# Patient Record
Sex: MALE | Race: WHITE | Employment: OTHER | URBAN - METROPOLITAN AREA
[De-identification: names, ages, dates, MRNs, and addresses within clinical notes are randomized per-mention and may not be internally consistent; named-entity substitution may affect disease eponyms.]

---

## 2021-07-19 ENCOUNTER — TRANSITIONAL CARE UNIT VISIT (OUTPATIENT)
Dept: GERIATRICS | Facility: CLINIC | Age: 71
End: 2021-07-19
Payer: MEDICARE

## 2021-07-19 ENCOUNTER — TRANSFERRED RECORDS (OUTPATIENT)
Dept: HEALTH INFORMATION MANAGEMENT | Facility: CLINIC | Age: 71
End: 2021-07-19

## 2021-07-19 ENCOUNTER — ANTICOAGULATION THERAPY VISIT (OUTPATIENT)
Dept: ANTICOAGULATION | Facility: CLINIC | Age: 71
End: 2021-07-19

## 2021-07-19 VITALS
RESPIRATION RATE: 18 BRPM | SYSTOLIC BLOOD PRESSURE: 144 MMHG | TEMPERATURE: 97 F | HEART RATE: 84 BPM | DIASTOLIC BLOOD PRESSURE: 80 MMHG | OXYGEN SATURATION: 98 %

## 2021-07-19 DIAGNOSIS — J42 CHRONIC BRONCHITIS, UNSPECIFIED CHRONIC BRONCHITIS TYPE (H): ICD-10-CM

## 2021-07-19 DIAGNOSIS — S80.12XS LEG HEMATOMA, LEFT, SEQUELA: ICD-10-CM

## 2021-07-19 DIAGNOSIS — Z79.4 TYPE 2 DIABETES MELLITUS WITH HYPERGLYCEMIA, WITH LONG-TERM CURRENT USE OF INSULIN (H): ICD-10-CM

## 2021-07-19 DIAGNOSIS — N18.9 CHRONIC KIDNEY DISEASE, UNSPECIFIED CKD STAGE: ICD-10-CM

## 2021-07-19 DIAGNOSIS — E11.65 TYPE 2 DIABETES MELLITUS WITH HYPERGLYCEMIA, WITH LONG-TERM CURRENT USE OF INSULIN (H): ICD-10-CM

## 2021-07-19 DIAGNOSIS — I48.91 A-FIB (H): Primary | ICD-10-CM

## 2021-07-19 DIAGNOSIS — R52 PAIN MANAGEMENT: ICD-10-CM

## 2021-07-19 DIAGNOSIS — E66.01 MORBID OBESITY (H): ICD-10-CM

## 2021-07-19 LAB — INR PPP: 2.6

## 2021-07-19 PROCEDURE — 99305 1ST NF CARE MODERATE MDM 35: CPT | Performed by: FAMILY MEDICINE

## 2021-07-19 NOTE — LETTER
7/19/2021        RE: Oliver Lawson  [address] [facility] [address]        [facility] GERIATRIC SERVICES    Facility:  [facility] ([facility]) [[id number]]  Code Status: FULL CODE      CHIEF COMPLAINT/REASON FOR VISIT:  Chief Complaint   Patient presents with     Hospital F/U       HPI:   Oliver is a 70 year old male who was recently admitted to the hospital on 7/12/2021. He does have a history of type 2 diabetes with chronic kidney disease unspecified. He also had a left below the knee amputation 2018 is currently on Coumadin. He was admitted to the hospital after fall he sustained by inappropriately or not appropriately putting his sleeve on for his prosthesis he slipped out of the prosthesis and fell on the ground. He was brought to the hospital and have mild contusion with no ecchymosis. Orthotics consulted and patient was fitted with a smaller  and patient improved with ice and elevation. Warfarin was continued hematoma was noted and likely the hematoma occurred with initial trauma no signs of any further bleeding. He is to follow-up with orthotics. He was discharged on reduced insulin dose of 45 units of Lantus and 12 units aspart. His blood sugars have been running 94 up to 335. This morning it was 149. Patient was treated appropriately and transferred here to the TCU at [facility] in stable condition.    Patient is sitting in his chair comfortably he is alert and oriented does not appear to be in acute distress. He is moving his bowels well at this time urinating without difficulty. The stump is still swollen does not fit in the prosthesis anymore and pain is well managed. He denies any other new issues at this time.    Past Medical History:  Past Medical History:   Diagnosis Date     Dyspnea on exertion      Dysthymic disorder      Gastro-oesophageal reflux disease      Hypertension      Lower limb amputation, great toe (H)     H/O OSTEOMYELITIS     Methicillin  resistant Staphylococcus aureus in conditions classified elsewhere and of unspecified site      Morbid obesity (H)      Nonspecific abnormal electrocardiogram (ECG) (EKG)      Numbness and tingling      Open wound of foot except toe(s) alone, complicated      Other and unspecified alcohol dependence, unspecified drinking behavior      Other and unspecified hyperlipidemia      Other diseases of lung, not elsewhere classified     NODULES     Renal insufficiency      Rheumatoid arthritis (H)      Shortness of breath      Sleep apnea     USES CPAP     Type II or unspecified type diabetes mellitus without mention of complication, not stated as uncontrolled      Unspecified pleural effusion            Surgical History:  Past Surgical History:   Procedure Laterality Date     CHEILECTOMY  10/21/2013    Procedure: CHEILECTOMY;  RIGHT CHEILECTOMY, CALCANEAL CUBITAL  AND DEBRIDEMENT;  Surgeon: Katharina Agudelo MD;  Location: [facility]     ENT SURGERY      T&A     EYE SURGERY      LASER FOR DIABETIC RETINOPATHY     GENITOURINARY SURGERY      BILAT HYDROCELECTOMY/VASECTOMY     GI SURGERY      PILONIDAL CYST     HEAD & NECK SURGERY      WISDOM TEETH     HERNIA REPAIR       ORTHOPEDIC SURGERY      right  and left toes, right YUNG       Family History:   No family history on file.    Social History:    Social History     Socioeconomic History     Marital status:      Spouse name: Not on file     Number of children: Not on file     Years of education: Not on file     Highest education level: Not on file   Occupational History     Not on file   Tobacco Use     Smoking status: Former Smoker     Quit date: 1993     Years since quittin.5   Substance and Sexual Activity     Alcohol use: No     Comment: NOT SINCE 12/12/10     Drug use: No     Sexual activity: Not on file   Other Topics Concern     Not on file   Social History Narrative     Not on file     Social Determinants of Health     Financial Resource Strain:       Difficulty of Paying Living Expenses:    Food Insecurity:      Worried About Running Out of Food in the Last Year:      Ran Out of Food in the Last Year:    Transportation Needs:      Lack of Transportation (Medical):      Lack of Transportation (Non-Medical):    Physical Activity:      Days of Exercise per Week:      Minutes of Exercise per Session:    Stress:      Feeling of Stress :    Social Connections:      Frequency of Communication with Friends and Family:      Frequency of Social Gatherings with Friends and Family:      Attends Protestant Services:      Active Member of Clubs or Organizations:      Attends Club or Organization Meetings:      Marital Status:    Intimate Partner Violence:      Fear of Current or Ex-Partner:      Emotionally Abused:      Physically Abused:      Sexually Abused:        REVIEW OF SYSTEM: Patient claims his pain is under adequate control denies any fever chills nausea vomit diarrhea change in vision hearing taste or smell weakness one-sided chest pain shortness of breath. Denies any current shortness stool polyphagia polydipsia polyuria depression or anxiety and the main review of systems is negative.      PHYSICAL EXAM: Patient is alert pleasant does not appear to be in acute distress. Her mucosa is moist nasal discharge neck is supple without lymphadenopathy or thyromegaly. Heart sounds are regular without any rubs murmurs or gallops lungs are clear to auscultation without any crackles rales or wheezes. Abdomen was obese nontender bowel sounds are positive. Right extremity did show some tenderness in the anterior part of the shin but no real calf tenderness and no thigh tenderness. Homans' sign was negative on the right. The left prosthesis had a stump  on it at this time but no signs of any tenderness but there was definitely palpable hematoma. Affect was pleasant neurologic seems nonfocal.        LABS: Hospital labs are as follows INR was 2.3 pro time was 24.9, blood  sugars running all over the place. GFR was greater than 60, sodium was 137, potassium 4.8, CO2 is 23, hemoglobin is 12.9,.      ASSESSMENT:    Encounter Diagnoses   Name Primary?     Leg hematoma, left, sequela      Pain management      Type 2 diabetes mellitus with hyperglycemia, with long-term current use of insulin (H)      Chronic kidney disease, unspecified CKD stage      Chronic bronchitis, unspecified chronic bronchitis type (H)      Morbid obesity (H)         PLAN: Plan at this time Coumadin clinic will manage INR.    Patient will continue on his same pain medication without any new changes.    Chronic kidney disease we will continue to monitor.    Chronic bronchitis not in exacerbation continue cares at this time.    Type 2 diabetes with hyperglycemia we will have nutrition follow for consistent carbohydrate diet and no changes in meds at this time.    I will continue to monitor above medical problems and no other changes to care plan at this time. We will follow his progress with therapy.        Electronically signed by: RIVERA LAY DO        Sincerely,        RIVERA LAY DO

## 2021-07-19 NOTE — PROGRESS NOTES
ANTICOAGULATION MANAGEMENT     Oliver Lawson 70 year old male is on warfarin with therapeutic INR result. (Goal INR 2.0-3.0)    No results for input(s): INR in the last 168 hours.    ASSESSMENT     Source(s): Home Care/Facility Nurse and Template       Warfarin doses taken: Warfarin taken as instructed    Diet: No new diet changes identified    New illness, injury, or hospitalization: yes, at [facility] 7/12-16 [facility] to [facility], leg prosthesis not fitting after fall    Medication/supplement changes: None noted    Signs or symptoms of bleeding or clotting: No    Previous INR: therapeutic    Additional findings: None new to [facility] following hospitalization for fall after he slipped out of leg prosthesis, hematoma. Warfarin dx is afib     PLAN     Recommended plan for no diet, medication or health factor changes affecting INR     Dosing Instructions: Continue your current warfarin dose with next INR in 3 days       Summary  As of 7/19/2021    Full warfarin instructions:  7.5 mg every day   Next INR check:  7/22/2021             Telephone call with Kerry nurse at [facility] who verbalizes understanding and agrees to plan    Lab visit scheduled    Education provided: None required    Plan made per [facility] anticoagulation protocol    Reynaldo Pritchett RN  Anticoagulation Clinic  7/19/2021    _______________________________________________________________________     Anticoagulation Episode Summary     Current INR goal:  2.0-3.0   TTR:  --   Target end date:     Send INR reminders to:  [facility] (TCU/LTC/TACHO)       Comments:           Anticoagulation Care Providers     Provider Role Specialty Phone number    Kamran Stern DO  Family Medicine [phone]

## 2021-07-20 ENCOUNTER — TRANSITIONAL CARE UNIT VISIT (OUTPATIENT)
Dept: GERIATRICS | Facility: CLINIC | Age: 71
End: 2021-07-20
Payer: MEDICARE

## 2021-07-20 VITALS
OXYGEN SATURATION: 99 % | HEART RATE: 63 BPM | SYSTOLIC BLOOD PRESSURE: 120 MMHG | RESPIRATION RATE: 18 BRPM | BODY MASS INDEX: 41.15 KG/M2 | TEMPERATURE: 97.2 F | DIASTOLIC BLOOD PRESSURE: 71 MMHG | WEIGHT: 315 LBS

## 2021-07-20 DIAGNOSIS — Z79.4 TYPE 2 DIABETES MELLITUS WITH HYPERGLYCEMIA, WITH LONG-TERM CURRENT USE OF INSULIN (H): ICD-10-CM

## 2021-07-20 DIAGNOSIS — E11.65 TYPE 2 DIABETES MELLITUS WITH HYPERGLYCEMIA, WITH LONG-TERM CURRENT USE OF INSULIN (H): ICD-10-CM

## 2021-07-20 DIAGNOSIS — Z89.512 HX OF BKA, LEFT (H): ICD-10-CM

## 2021-07-20 DIAGNOSIS — I10 ESSENTIAL HYPERTENSION: ICD-10-CM

## 2021-07-20 DIAGNOSIS — S80.12XS LEG HEMATOMA, LEFT, SEQUELA: Primary | ICD-10-CM

## 2021-07-20 DIAGNOSIS — N18.9 CHRONIC KIDNEY DISEASE, UNSPECIFIED CKD STAGE: ICD-10-CM

## 2021-07-20 DIAGNOSIS — I48.0 PAROXYSMAL ATRIAL FIBRILLATION (H): ICD-10-CM

## 2021-07-20 PROCEDURE — 99309 SBSQ NF CARE MODERATE MDM 30: CPT | Performed by: NURSE PRACTITIONER

## 2021-07-20 NOTE — PROGRESS NOTES
[facility] GERIATRIC SERVICES    Code Status:  FULL CODE   Visit Type:   Chief Complaint   Patient presents with     Nursing Home Acute     TCU Follow up     Facility:  [facility] ([facility]) [[id number]]           History of Present Illness: Oliver Lawson is a 70 year old male who I am seeing today for follow up on the TCU. Pt recently hospitalized on 7/12/2021.  Past medical history includes uncontrolled diabetes mellitus type 2 with recurrent diabetic foot wound status post left BKA in 2018, CKD, proximal atrial fib on warfarin, complete heart block, status post PPM, RA on methotrexate, class III obesity, COPD, GERD and depression.  Patient recently had a fall onto his hands and knees with his left leg slipping out of his prosthesis.  Patient suffered a contusion of the left stump.  X-ray of the right knee and left amputation site negative for fracture.  Orthotics was consulted and patient fitted with a smaller strength stump .  Patient had some difficulty fitting into his prosthesis.  He underwent a CT which demonstrated a 7.5 x 5 cm hematoma at the amputation stump.  Warfarin was continued.  Hemoglobin remained stable.  Patient was seen by orthotics and it was recommended that he continue in shrinkers until prosthesis can fit in about 7 to 10 days.  Patient is unable to ambulate without prosthesis.  Diabetes mellitus type 2.  His insulin was decreased during hospitalization.  His warfarin was also reduced.    Today patient lying in bed.  He is still unable to get his stump on.  Continues with size 2 stump .  He says orthotics is, not on Friday to fit him for a size 1 stump .  He continues with hematoma.  No increase evidence of bleeding.  Hemoglobin has remained stable.  He does have underlying proximal atrial fib and continues on chronic anticoagulation.  INRs managed per the Coumadin clinic.  Hypertension.  Blood pressure satisfactory.  Pain control with Tylenol.  Diabetes mellitus  type 2.  Blood sugars at this time satisfactory.  Patient does report some chronic stuffiness of the nose and postnasal drip.  He has tried Flonase in the past.  He continues on Zyrtec  Which she feels was not very helpful.    Active Ambulatory Problems     Diagnosis Date Noted     Leg hematoma, left, sequela 07/19/2021     Pain management 07/19/2021     Type 2 diabetes mellitus with hyperglycemia (H) 07/19/2021     Chronic kidney disease, unspecified CKD stage 07/19/2021     Chronic bronchitis (H) 07/19/2021     Morbid obesity (H) 07/19/2021     A-fib (H) 07/19/2021     Resolved Ambulatory Problems     Diagnosis Date Noted     No Resolved Ambulatory Problems     Past Medical History:   Diagnosis Date     Dyspnea on exertion      Dysthymic disorder      Gastro-oesophageal reflux disease      Hypertension      Lower limb amputation, great toe (H)      Methicillin resistant Staphylococcus aureus in conditions classified elsewhere and of unspecified site      Nonspecific abnormal electrocardiogram (ECG) (EKG)      Numbness and tingling      Open wound of foot except toe(s) alone, complicated      Other and unspecified alcohol dependence, unspecified drinking behavior      Other and unspecified hyperlipidemia      Other diseases of lung, not elsewhere classified      Renal insufficiency      Rheumatoid arthritis (H)      Shortness of breath      Sleep apnea      Type II or unspecified type diabetes mellitus without mention of complication, not stated as uncontrolled      Unspecified pleural effusion        Current Outpatient Medications:      ASPIRIN PO, Take 81 mg by mouth daily, Disp: , Rfl:      Atorvastatin Calcium (LIPITOR PO), Take 80 mg by mouth daily, Disp: , Rfl:      CIPROFLOXACIN PO, Take 500 mg by mouth 2 times daily, Disp: , Rfl:      FOLIC ACID PO, Take 1 mg by mouth daily, Disp: , Rfl:      HYDROcodone-acetaminophen (NORCO) 5-325 MG per tablet, Take 1-2 tablets by mouth every 4 hours as needed for other  (Moderate to Severe Pain), Disp: 50 tablet, Rfl: 0     insulin aspart (NovoLOG) injection, Inject Subcutaneous See Admin Instructions 23 UNITS BREAKFAST 25 UNITS LUNCH 25 UNITS DINNER, Disp: , Rfl:      insulin glargine (LANTUS) SOLN 100 UNIT/ML, Inject 62 Units Subcutaneous At Bedtime, Disp: , Rfl:      LISINOPRIL PO, Take 40 mg by mouth daily TAKE 0.5 TAB DAILY [20 MG DAILY], Disp: , Rfl:      Methotrexate, Anti-Rheumatic, (RHEUMATREX PO), Take 2.5 mg by mouth once a week TAKE 8 TABS ONCE A WEEK, Disp: , Rfl:      Metoprolol Succinate (TOPROL XL PO), Take 50 mg by mouth daily, Disp: , Rfl:      NIFEdipine osmotic (NIFEDICAL XL) 30 MG 24 hr tablet, Take 30 mg by mouth daily, Disp: , Rfl:      Nitroglycerin (NITROSTAT SL), Place 0.4 mg under the tongue every 5 minutes as needed, Disp: , Rfl:      Pantoprazole Sodium (PROTONIX PO), Take 40 mg by mouth every morning (before breakfast), Disp: , Rfl:      senna-docusate (SENOKOT-S;PERICOLACE) 8.6-50 MG per tablet, Take 2 tablets by mouth daily as needed for constipation Take while on oral narcotics to prevent or treat constipation., Disp: 30 tablet, Rfl: 0     Sertraline HCl (ZOLOFT PO), Take 100 mg by mouth daily, Disp: , Rfl:      Sulfamethoxazole-Trimethoprim (BACTRIM DS PO), Take 1 tablet by mouth 2 times daily, Disp: , Rfl:   Allergies   Allergen Reactions     Angiotensin Receptor Blockers      ARB-Angiotensin Receptor Antagonist     Contrast Dye      Diatrizoate Other (See Comments)     Patient recommended by a past doctor to not use Contrast Dye due to body chemistry    -- seriously? Someone check into this     Ace Inhibitors Cough     Changed to ARB.        All Meds and Allergies reviewed in the record at the facility.     REVIEW OF SYSTEMS:   Review of Systems  No fevers or chills. No headache, lightheadedness or dizziness.  Chronic stuffiness.  No SOB, chest pains or palpitations. Appetite is good. No nausea, vomiting, constipation or diarrhea. No dysuria,  frequency, burning or pain with urination.  Patient continues in stump .  He continues not to be able to use his prosthesis.  Pain well controlled Tylenol.  Otherwise review of systems are negative.     PHYSICAL EXAMINATION:  Physical Exam     Vital signs: /71   Pulse 63   Temp 97.2  F (36.2  C)   Resp 18   Wt 143.4 kg (316 lb 3.2 oz)   SpO2 99%   BMI 41.15 kg/m    General: Awake, Alert, oriented x3, appropriately, follows simple commands, conversant  HEENT: Pink conjunctiva, anicteric sclerae, moist oral mucosa.  Nasal stuffiness.  NECK: Supple, without any lymphadenopathy, or masses  CVS:  S1  S2, without murmur or gallop.   LUNG: Clear to auscultation, No wheezes, rales or rhonci.  BACK: No kyphosis of the thoracic spine  ABDOMEN: Soft, obese, nontender to palpation, with positive bowel sounds  EXTREMITIES: Moves both upper and lower extremities, no pedal edema, no calf tenderness.  Left BKA.  SKIN: Warm and dry.  Hematoma to the base of the stump on the left as well as mid calf of the right leg.   NEUROLOGIC: Intact, pulses palpable on the RLE.   PSYCHIATRIC: Cognition intact      Labs:  All labs reviewed in the nursing home record.  Recent Results (from the past 48 hour(s))   INR    Collection Time: 07/19/21 12:00 AM   Result Value Ref Range    INR 2.6         Assessment/Plan:    ICD-10-CM    1. Leg hematoma, left, sequela  S80.12XS  continue with icing and elevation.  Continue with stump .  Patient to be fitted for size 1 stump  on Friday.   2. Pain management  R52  continue with Tylenol and ice.   3. Type 2 diabetes mellitus with hyperglycemia, with long-term current use of insulin (H)  E11.65  satisfactory controlled.  Continue to monitor.  Patient may need adjustments in insulin secondary to decreased during hospitalization.    Z79.4    4. Chronic kidney disease, unspecified CKD stage  N18.9  chronic.  Follow-up BMP on Thursday.   5. Atrial fib   continues on Xarelto.    6. Essential hypertension   satisfactory control.  Follow-up BMP on Thursday.         This note has been dictated using voice recognition software. Any grammatical or context distortions are unintentional and inherent to the software    Electronically signed by: Tram Guy CNP

## 2021-07-22 ENCOUNTER — TRANSITIONAL CARE UNIT VISIT (OUTPATIENT)
Dept: GERIATRICS | Facility: CLINIC | Age: 71
End: 2021-07-22
Payer: MEDICARE

## 2021-07-22 ENCOUNTER — ANTICOAGULATION THERAPY VISIT (OUTPATIENT)
Dept: ANTICOAGULATION | Facility: CLINIC | Age: 71
End: 2021-07-22

## 2021-07-22 VITALS
BODY MASS INDEX: 41.15 KG/M2 | TEMPERATURE: 97 F | SYSTOLIC BLOOD PRESSURE: 126 MMHG | HEART RATE: 60 BPM | OXYGEN SATURATION: 98 % | WEIGHT: 315 LBS | DIASTOLIC BLOOD PRESSURE: 73 MMHG | RESPIRATION RATE: 20 BRPM

## 2021-07-22 DIAGNOSIS — S80.12XS LEG HEMATOMA, LEFT, SEQUELA: Primary | ICD-10-CM

## 2021-07-22 DIAGNOSIS — E11.65 TYPE 2 DIABETES MELLITUS WITH HYPERGLYCEMIA, WITH LONG-TERM CURRENT USE OF INSULIN (H): ICD-10-CM

## 2021-07-22 DIAGNOSIS — I48.0 PAROXYSMAL ATRIAL FIBRILLATION (H): ICD-10-CM

## 2021-07-22 DIAGNOSIS — Z89.512 HX OF BKA, LEFT (H): ICD-10-CM

## 2021-07-22 DIAGNOSIS — Z79.4 TYPE 2 DIABETES MELLITUS WITH HYPERGLYCEMIA, WITH LONG-TERM CURRENT USE OF INSULIN (H): ICD-10-CM

## 2021-07-22 DIAGNOSIS — R09.82 PND (POST-NASAL DRIP): ICD-10-CM

## 2021-07-22 LAB — INR (EXTERNAL): 2.4 (ref 0.9–1.1)

## 2021-07-22 PROCEDURE — 99309 SBSQ NF CARE MODERATE MDM 30: CPT | Performed by: NURSE PRACTITIONER

## 2021-07-22 RX ORDER — LORATADINE 10 MG/1
10 TABLET ORAL DAILY
COMMUNITY
End: 2022-05-09

## 2021-07-22 NOTE — PROGRESS NOTES
[facility] GERIATRIC SERVICES    Code Status:  FULL CODE   Visit Type TCU follow-up (left BKA hematoma)   Facility: [facility] skilled nursing [facility], TCU      History of Present Illness: Oliver Lawson is a 70 year old male who I am seeing today for follow up on the TCU. Pt recently hospitalized on 7/12/2021.  Past medical history includes uncontrolled diabetes mellitus type 2 with recurrent diabetic foot wound status post left BKA in 2018, CKD, proximal atrial fib on warfarin, complete heart block, status post PPM, RA on methotrexate, class III obesity, COPD, GERD and depression.  Patient recently had a fall onto his hands and knees with his left leg slipping out of his prosthesis.  Patient suffered a contusion of the left stump.  X-ray of the right knee and left amputation site negative for fracture.  Orthotics was consulted and patient fitted with a smaller strength stump .  Patient had some difficulty fitting into his prosthesis.  He underwent a CT which demonstrated a 7.5 x 5 cm hematoma at the amputation stump.  Warfarin was continued.  Hemoglobin remained stable.  Patient was seen by orthotics and it was recommended that he continue in shrinkers until prosthesis can fit in about 7 to 10 days.  Patient is unable to ambulate without prosthesis.  Diabetes mellitus type 2.  His insulin was decreased during hospitalization.  His warfarin was also reduced.    Today patient lying in bed.  Patient continues with hematoma to left stump.  Today orthotics is to come out and place him in a size 1 stump .  He continues with ice.  Pain greatly improved.  Continues on Tylenol.  Underlying diabetes mellitus type 2.  He does have a blood sugar control device.  Today he had a blood sugar of 78.  He was asymptomatic.  When he looked back over all his blood sugars are satisfactory.  Patient continues on aspart 12 units 3 times daily along with sliding scale and Lantus 45 at bedtime.  He did not  have a bedtime snack last evening.  Patient with underlying atrial fib.  Continues on chronic anticoagulation.  Hypertension.  Blood pressure satisfactory.  Patient has a chronic postnasal drip.  He admitted on Zyrtec.  He feels this is not very helpful.  We did talk about trialing Claritin.    Active Ambulatory Problems     Diagnosis Date Noted     Leg hematoma, left, sequela 07/19/2021     Pain management 07/19/2021     Type 2 diabetes mellitus with hyperglycemia (H) 07/19/2021     Chronic kidney disease, unspecified CKD stage 07/19/2021     Chronic bronchitis (H) 07/19/2021     Morbid obesity (H) 07/19/2021     A-fib (H) 07/19/2021     Resolved Ambulatory Problems     Diagnosis Date Noted     No Resolved Ambulatory Problems     Past Medical History:   Diagnosis Date     Dyspnea on exertion      Dysthymic disorder      Gastro-oesophageal reflux disease      Hypertension      Lower limb amputation, great toe (H)      Methicillin resistant Staphylococcus aureus in conditions classified elsewhere and of unspecified site      Nonspecific abnormal electrocardiogram (ECG) (EKG)      Numbness and tingling      Open wound of foot except toe(s) alone, complicated      Other and unspecified alcohol dependence, unspecified drinking behavior      Other and unspecified hyperlipidemia      Other diseases of lung, not elsewhere classified      Renal insufficiency      Rheumatoid arthritis (H)      Shortness of breath      Sleep apnea      Type II or unspecified type diabetes mellitus without mention of complication, not stated as uncontrolled      Unspecified pleural effusion        Current Outpatient Medications:      loratadine (CLARITIN) 10 MG tablet, Take 10 mg by mouth daily, Disp: , Rfl:      ASPIRIN PO, Take 81 mg by mouth daily, Disp: , Rfl:      Atorvastatin Calcium (LIPITOR PO), Take 80 mg by mouth daily, Disp: , Rfl:      CIPROFLOXACIN PO, Take 500 mg by mouth 2 times daily, Disp: , Rfl:      FOLIC ACID PO, Take 1 mg  by mouth daily, Disp: , Rfl:      HYDROcodone-acetaminophen (NORCO) 5-325 MG per tablet, Take 1-2 tablets by mouth every 4 hours as needed for other (Moderate to Severe Pain), Disp: 50 tablet, Rfl: 0     insulin aspart (NovoLOG) injection, Inject Subcutaneous See Admin Instructions 23 UNITS BREAKFAST 25 UNITS LUNCH 25 UNITS DINNER, Disp: , Rfl:      insulin glargine (LANTUS) SOLN 100 UNIT/ML, Inject 62 Units Subcutaneous At Bedtime, Disp: , Rfl:      LISINOPRIL PO, Take 40 mg by mouth daily TAKE 0.5 TAB DAILY [20 MG DAILY], Disp: , Rfl:      Methotrexate, Anti-Rheumatic, (RHEUMATREX PO), Take 2.5 mg by mouth once a week TAKE 8 TABS ONCE A WEEK, Disp: , Rfl:      Metoprolol Succinate (TOPROL XL PO), Take 50 mg by mouth daily, Disp: , Rfl:      NIFEdipine osmotic (NIFEDICAL XL) 30 MG 24 hr tablet, Take 30 mg by mouth daily, Disp: , Rfl:      Nitroglycerin (NITROSTAT SL), Place 0.4 mg under the tongue every 5 minutes as needed, Disp: , Rfl:      Pantoprazole Sodium (PROTONIX PO), Take 40 mg by mouth every morning (before breakfast), Disp: , Rfl:      senna-docusate (SENOKOT-S;PERICOLACE) 8.6-50 MG per tablet, Take 2 tablets by mouth daily as needed for constipation Take while on oral narcotics to prevent or treat constipation., Disp: 30 tablet, Rfl: 0     Sertraline HCl (ZOLOFT PO), Take 100 mg by mouth daily, Disp: , Rfl:      Sulfamethoxazole-Trimethoprim (BACTRIM DS PO), Take 1 tablet by mouth 2 times daily, Disp: , Rfl:   Allergies   Allergen Reactions     Angiotensin Receptor Blockers      ARB-Angiotensin Receptor Antagonist     Contrast Dye      Diatrizoate Other (See Comments)     Patient recommended by a past doctor to not use Contrast Dye due to body chemistry    -- seriously? Someone check into this     Ace Inhibitors Cough     Changed to ARB.        All Meds and Allergies reviewed in the record at the facility.     REVIEW OF SYSTEMS:   Review of Systems  No fevers or chills. No headache, lightheadedness or  dizziness.  Chronic stuffiness.  No SOB, chest pains or palpitations. Appetite is good. No nausea, vomiting, constipation or diarrhea. No dysuria, frequency, burning or pain with urination.  Patient continues in stump .  He continues not to be able to use his prosthesis.  Pain well controlled Tylenol.  Otherwise review of systems are negative.     PHYSICAL EXAMINATION:  Physical Exam     Vital signs: /73   Pulse 60   Temp 97  F (36.1  C)   Resp 20   Wt 143.4 kg (316 lb 3.2 oz)   SpO2 98%   BMI 41.15 kg/m    General: Awake, Alert, oriented x3, appropriately, follows simple commands, conversant  HEENT: Pink conjunctiva, anicteric sclerae, moist oral mucosa.  Nasal stuffiness.  NECK: Supple, without any lymphadenopathy, or masses  CVS:  S1  S2, without murmur or gallop.   LUNG: Clear to auscultation, No wheezes, rales or rhonci.  BACK: No kyphosis of the thoracic spine  ABDOMEN: Soft, obese, nontender to palpation, with positive bowel sounds  EXTREMITIES: Moves both upper and lower extremities, no pedal edema, no calf tenderness.  Left BKA.  SKIN: Warm and dry.  Hematoma to the base of the stump on the left as well as mid calf of the right leg.   NEUROLOGIC: Intact, pulses palpable on the RLE.   PSYCHIATRIC: Cognition intact      Labs:  All labs reviewed in the nursing home record.  Recent Results (from the past 48 hour(s))   INR (External Result)    Collection Time: 07/22/21  9:00 AM   Result Value Ref Range    INR (External) 2.4 (A) 0.9 - 1.1        Assessment/Plan:    ICD-10-CM    1. Leg hematoma, left, sequela  S80.12XS  continue with icing and elevation.  Continue with stump .  Patient to be fitted for size 1 stump  by orthotics.   2. Pain management  R52  continue with Tylenol and ice.   3. Type 2 diabetes mellitus with hyperglycemia, with long-term current use of insulin (H)  E11.65  satisfactory controlled.  Continue to monitor.  Patient may need adjustments in insulin  secondary to decreased during hospitalization.    Z79.4    4. Chronic kidney disease, unspecified CKD stage  N18.9  chronic.  Follow-up BMP on Thursday.   5. Atrial fib   continues on Coumadin.  INR is managed per the Coumadin clinic.   6. Essential hypertension   satisfactory control.  Follow-up BMP unremarkable.   7. Postnasal drip   chronic.  DC Zyrtec.  Trial Claritin 10 mg p.o. daily.         This note has been dictated using voice recognition software. Any grammatical or context distortions are unintentional and inherent to the software    Electronically signed by: Tram Guy, CNP

## 2021-07-22 NOTE — PROGRESS NOTES
Spoke with Kerry at [facility]. INR today 2.4.    He has taken 7.5 mg daily.    No changes in medication or issues.

## 2021-07-22 NOTE — PROGRESS NOTES
ANTICOAGULATION MANAGEMENT     Oliver Lawson 70 year old male is on warfarin with therapeutic INR result. (Goal INR 2.0-3.0)    Recent labs: (last 7 days)     07/22/21  0900   INR 2.4*       ASSESSMENT     Source(s): Home Care/Facility Nurse       Warfarin doses taken: Warfarin taken as instructed    Diet: No new diet changes identified    New illness, injury, or hospitalization: No    Medication/supplement changes: None noted    Signs or symptoms of bleeding or clotting: No    Previous INR: Therapeutic last 2(+) visits    Additional findings: None     PLAN     Recommended plan for no diet, medication or health factor changes affecting INR     Dosing Instructions: Continue your current warfarin dose with next INR in 5 days       Summary  As of 7/22/2021    Full warfarin instructions:  7.5 mg every day   Next INR check:               Telephone call with Kerry nurse at [facility] facility who verbalizes understanding and agrees to plan and who agrees to plan and repeated back plan correctly    Orders given to  Homecare nurse/facility to recheck    Education provided: Importance of therapeutic range    Plan made per [facility] anticoagulation protocol    Saritha Velasquez RN  Anticoagulation Clinic  7/22/2021    _______________________________________________________________________     Anticoagulation Episode Summary     Current INR goal:  2.0-3.0   TTR:  --   Target end date:  Indefinite   Send INR reminders to:  [facility] (TCU/LTC/detention)    Indications    A-fib (H) [I48.91]           Comments:           Anticoagulation Care Providers     Provider Role Specialty Phone number    Kamran Stern DO Referring Family Medicine [phone]

## 2021-07-26 ENCOUNTER — ANTICOAGULATION THERAPY VISIT (OUTPATIENT)
Dept: ANTICOAGULATION | Facility: CLINIC | Age: 71
End: 2021-07-26

## 2021-07-26 ENCOUNTER — TRANSITIONAL CARE UNIT VISIT (OUTPATIENT)
Dept: GERIATRICS | Facility: CLINIC | Age: 71
End: 2021-07-26
Payer: MEDICARE

## 2021-07-26 VITALS
WEIGHT: 315 LBS | HEIGHT: 71 IN | HEART RATE: 73 BPM | SYSTOLIC BLOOD PRESSURE: 121 MMHG | OXYGEN SATURATION: 98 % | DIASTOLIC BLOOD PRESSURE: 74 MMHG | RESPIRATION RATE: 20 BRPM | TEMPERATURE: 97 F | BODY MASS INDEX: 44.1 KG/M2

## 2021-07-26 DIAGNOSIS — I48.0 PAROXYSMAL ATRIAL FIBRILLATION (H): ICD-10-CM

## 2021-07-26 DIAGNOSIS — R09.82 PND (POST-NASAL DRIP): ICD-10-CM

## 2021-07-26 DIAGNOSIS — E11.65 TYPE 2 DIABETES MELLITUS WITH HYPERGLYCEMIA, WITH LONG-TERM CURRENT USE OF INSULIN (H): ICD-10-CM

## 2021-07-26 DIAGNOSIS — I48.91 A-FIB (H): Primary | ICD-10-CM

## 2021-07-26 DIAGNOSIS — Z79.4 TYPE 2 DIABETES MELLITUS WITH HYPERGLYCEMIA, WITH LONG-TERM CURRENT USE OF INSULIN (H): ICD-10-CM

## 2021-07-26 DIAGNOSIS — Z89.512 HX OF BKA, LEFT (H): ICD-10-CM

## 2021-07-26 DIAGNOSIS — S80.12XS LEG HEMATOMA, LEFT, SEQUELA: Primary | ICD-10-CM

## 2021-07-26 LAB — INR (EXTERNAL): 3.3 (ref 2–3)

## 2021-07-26 PROCEDURE — 99309 SBSQ NF CARE MODERATE MDM 30: CPT | Performed by: NURSE PRACTITIONER

## 2021-07-26 ASSESSMENT — MIFFLIN-ST. JEOR: SCORE: 2217.29

## 2021-07-26 NOTE — PROGRESS NOTES
ANTICOAGULATION MANAGEMENT     Oliver Lawson 70 year old male is on warfarin with supratherapeutic INR result. (Goal INR 2.0-3.0)    Recent labs: (last 7 days)     07/22/21  0900   INR 2.4*       ASSESSMENT     Source(s): Home Care/Facility Nurse       Warfarin doses taken: Warfarin taken as instructed    Diet: No new diet changes identified    New illness, injury, or hospitalization: No    Medication/supplement changes: None noted    Signs or symptoms of bleeding or clotting: No    Previous INR: Therapeutic last 2(+) visits    Additional findings: None     PLAN     Recommended plan for no diet, medication or health factor changes affecting INR     Dosing Instructions:  Decrease your warfarin dose (9.5% change) with next INR in 1 week       Summary  As of 7/26/2021    Full warfarin instructions:  5 mg every Mon, Thu; 7.5 mg all other days   Next INR check:  8/2/2021             Telephone call with a nurse at [facility] who verbalizes understanding and agrees to plan    Orders given to  Homecare nurse/facility to recheck    Education provided: None required    Plan made per [facility] anticoagulation protocol    Reynaldo Pritchett RN  Anticoagulation Clinic  7/26/2021    _______________________________________________________________________     Anticoagulation Episode Summary     Current INR goal:  2.0-3.0   TTR:  --   Target end date:  Indefinite   Send INR reminders to:  [facility] (TCU/LTC/[facility])    Indications    A-fib (H) [I48.91]           Comments:           Anticoagulation Care Providers     Provider Role Specialty Phone number    Kamran Stern DO Referring Family Medicine [phone]

## 2021-07-26 NOTE — PROGRESS NOTES
7/26/21 - [facility]  INR - 3.3    Warfarin dose - 7.5mg daily    Questionaire - Loratadine 10mg daily and discontinued Zyrtec,

## 2021-07-27 NOTE — PROGRESS NOTES
[facility] GERIATRIC SERVICES    Code Status:  FULL CODE   Visit Type TCU follow-up (left BKA hematoma)   Facility: [facility] skilled nursing [facility], TCU      History of Present Illness: Oliver Lawson is a 70 year old male who I am seeing today for follow up on the TCU. Pt recently hospitalized on 7/12/2021.  Past medical history includes uncontrolled diabetes mellitus type 2 with recurrent diabetic foot wound status post left BKA in 2018, CKD, proximal atrial fib on warfarin, complete heart block, status post PPM, RA on methotrexate, class III obesity, COPD, GERD and depression.  Patient recently had a fall onto his hands and knees with his left leg slipping out of his prosthesis.  Patient suffered a contusion of the left stump.  X-ray of the right knee and left amputation site negative for fracture.  Orthotics was consulted and patient fitted with a smaller strength stump .  Patient had some difficulty fitting into his prosthesis.  He underwent a CT which demonstrated a 7.5 x 5 cm hematoma at the amputation stump.  Warfarin was continued.  Hemoglobin remained stable.  Patient was seen by orthotics and it was recommended that he continue in shrinkers until prosthesis can fit in about 7 to 10 days.  Patient is unable to ambulate without prosthesis.  Diabetes mellitus type 2.  His insulin was decreased during hospitalization.  His warfarin was also reduced.    Today patient sitting up in wheelchair.  Patient with a history of left BKA.  He does wear prosthetic.  Recent fall sustaining hematoma to the left BKA.  He has been unable to return to his prosthesis secondary to swelling.  Continues in stump .  He was seen by orthotics on Thursday and received a size 1 stump .  He tells me today that orthotics reports that he will need a new mold for the socket of his prosthetic.  They are coming out tomorrow to size him for this.  He is hopeful that he will be able to put back on his  prosthesis by the end of the week and be able to walk again.  He does have stairs in his home and will need to be able to go up and down stairs.  Patient with underlying diabetes mellitus type 2.  Blood sugars appear better controlled on today's visit.  He was previously low in the a.m.  He does have occasional elevated in the afternoon.  Underlying atrial fib on chronic anticoagulation.  Hypertension.  Blood pressure satisfactory.  He does have chronic postnasal drip.  I did DC his Zyrtec and start him on Claritin he feels it somewhat helpful.  He has used Flonase in the past and we did talk about this today however he would like to start that at home because he has a supply at home.      Active Ambulatory Problems     Diagnosis Date Noted     Leg hematoma, left, sequela 07/19/2021     Pain management 07/19/2021     Type 2 diabetes mellitus with hyperglycemia (H) 07/19/2021     Chronic kidney disease, unspecified CKD stage 07/19/2021     Chronic bronchitis (H) 07/19/2021     Morbid obesity (H) 07/19/2021     A-fib (H) 07/19/2021     Resolved Ambulatory Problems     Diagnosis Date Noted     No Resolved Ambulatory Problems     Past Medical History:   Diagnosis Date     Dyspnea on exertion      Dysthymic disorder      Gastro-oesophageal reflux disease      Hypertension      Lower limb amputation, great toe (H)      Methicillin resistant Staphylococcus aureus in conditions classified elsewhere and of unspecified site      Nonspecific abnormal electrocardiogram (ECG) (EKG)      Numbness and tingling      Open wound of foot except toe(s) alone, complicated      Other and unspecified alcohol dependence, unspecified drinking behavior      Other and unspecified hyperlipidemia      Other diseases of lung, not elsewhere classified      Renal insufficiency      Rheumatoid arthritis (H)      Shortness of breath      Sleep apnea      Type II or unspecified type diabetes mellitus without mention of complication, not stated as  uncontrolled      Unspecified pleural effusion        Current Outpatient Medications:      ASPIRIN PO, Take 81 mg by mouth daily, Disp: , Rfl:      Atorvastatin Calcium (LIPITOR PO), Take 80 mg by mouth daily, Disp: , Rfl:      CIPROFLOXACIN PO, Take 500 mg by mouth 2 times daily, Disp: , Rfl:      FOLIC ACID PO, Take 1 mg by mouth daily, Disp: , Rfl:      HYDROcodone-acetaminophen (NORCO) 5-325 MG per tablet, Take 1-2 tablets by mouth every 4 hours as needed for other (Moderate to Severe Pain), Disp: 50 tablet, Rfl: 0     insulin aspart (NovoLOG) injection, Inject Subcutaneous See Admin Instructions 23 UNITS BREAKFAST 25 UNITS LUNCH 25 UNITS DINNER, Disp: , Rfl:      insulin glargine (LANTUS) SOLN 100 UNIT/ML, Inject 62 Units Subcutaneous At Bedtime, Disp: , Rfl:      LISINOPRIL PO, Take 40 mg by mouth daily TAKE 0.5 TAB DAILY [20 MG DAILY], Disp: , Rfl:      loratadine (CLARITIN) 10 MG tablet, Take 10 mg by mouth daily, Disp: , Rfl:      Methotrexate, Anti-Rheumatic, (RHEUMATREX PO), Take 2.5 mg by mouth once a week TAKE 8 TABS ONCE A WEEK, Disp: , Rfl:      Metoprolol Succinate (TOPROL XL PO), Take 50 mg by mouth daily, Disp: , Rfl:      NIFEdipine osmotic (NIFEDICAL XL) 30 MG 24 hr tablet, Take 30 mg by mouth daily, Disp: , Rfl:      Nitroglycerin (NITROSTAT SL), Place 0.4 mg under the tongue every 5 minutes as needed, Disp: , Rfl:      Pantoprazole Sodium (PROTONIX PO), Take 40 mg by mouth every morning (before breakfast), Disp: , Rfl:      senna-docusate (SENOKOT-S;PERICOLACE) 8.6-50 MG per tablet, Take 2 tablets by mouth daily as needed for constipation Take while on oral narcotics to prevent or treat constipation., Disp: 30 tablet, Rfl: 0     Sertraline HCl (ZOLOFT PO), Take 100 mg by mouth daily, Disp: , Rfl:      Sulfamethoxazole-Trimethoprim (BACTRIM DS PO), Take 1 tablet by mouth 2 times daily, Disp: , Rfl:   Allergies   Allergen Reactions     Angiotensin Receptor Blockers      ARB-Angiotensin Receptor  "Antagonist     Contrast Dye      Diatrizoate Other (See Comments)     Patient recommended by a past doctor to not use Contrast Dye due to body chemistry    -- seriously? Someone check into this     Ace Inhibitors Cough     Changed to ARB.        All Meds and Allergies reviewed in the record at the facility.     REVIEW OF SYSTEMS:   Review of Systems  No fevers or chills. No headache, lightheadedness or dizziness.  Chronic stuffiness.  No SOB, chest pains or palpitations. Appetite is good. No nausea, vomiting, constipation or diarrhea. No dysuria, frequency, burning or pain with urination.  Patient continues in stump .  He continues not to be able to use his prosthesis.  Pain well controlled Tylenol.  Otherwise review of systems are negative.     PHYSICAL EXAMINATION:  Physical Exam     Vital signs: /74   Pulse 73   Temp 97  F (36.1  C)   Resp 20   Ht 1.803 m (5' 11\")   Wt 144.5 kg (318 lb 9.6 oz)   SpO2 98%   BMI 44.44 kg/m    General: Awake, Alert, oriented x3, appropriately, follows simple commands, conversant  HEENT: Pink conjunctiva, anicteric sclerae, moist oral mucosa.  Nasal stuffiness.  NECK: Supple, without any lymphadenopathy, or masses  CVS:  S1  S2, without murmur or gallop.   LUNG: Clear to auscultation, No wheezes, rales or rhonci.  BACK: No kyphosis of the thoracic spine  ABDOMEN: Soft, obese, nontender to palpation, with positive bowel sounds  EXTREMITIES: Moves both upper and lower extremities, no pedal edema, no calf tenderness.  Left BKA.  Stump  on.  SKIN: Warm and dry.  Hematoma to the base of the stump on the left as well as mid calf of the right leg.   NEUROLOGIC: Intact, pulses palpable on the RLE.   PSYCHIATRIC: Cognition intact      Labs:  All labs reviewed in the nursing home record.  Recent Results (from the past 48 hour(s))   INR (External Result)    Collection Time: 07/26/21 12:00 AM   Result Value Ref Range    INR (External) 3.3 (A) 2 - 3    "     Assessment/Plan:    ICD-10-CM    1. Leg hematoma, left, sequela  S80.12XS  continue with icing and elevation.  Continue with stump .  Patient recently placed in size 1 stump  by orthotics.  He is going to happen to have a new molding of the socket for his prosthesis.  Orthotics will return tomorrow for this molding and hopefully will have his new socket by the end of the week.   2. Pain management  R52  continue with Tylenol and ice.   3. Type 2 diabetes mellitus with hyperglycemia, with long-term current use of insulin (H)  E11.65  satisfactory controlled.  Continue to monitor.  Patient may need adjustments in insulin secondary to decreased during hospitalization.    Z79.4    4. Chronic kidney disease, unspecified CKD stage  N18.9  chronic.  Follow-up BMP unremarkable.   5. Atrial fib   continues on Coumadin.  INR is managed per the Coumadin clinic.   6. Essential hypertension   satisfactory control.  Follow-up BMP unremarkable.   7. Postnasal drip   chronic.  Zyrtec discontinued.  Patient started on Claritin.  Some improvement.         This note has been dictated using voice recognition software. Any grammatical or context distortions are unintentional and inherent to the software    Electronically signed by: Tram Guy, CNP

## 2021-08-02 ENCOUNTER — ANTICOAGULATION THERAPY VISIT (OUTPATIENT)
Dept: ANTICOAGULATION | Facility: CLINIC | Age: 71
End: 2021-08-02

## 2021-08-02 DIAGNOSIS — I48.91 A-FIB (H): Primary | ICD-10-CM

## 2021-08-02 LAB — INR (EXTERNAL): 3.3 (ref 0.9–1.1)

## 2021-08-02 NOTE — PROGRESS NOTES
8/2/21 - [facility]  INR - 3.3    Warfarin dose -  5mg on Mon/Thur and 3.5mg all other days.    Questionaire - answered NO to all.

## 2021-08-03 ENCOUNTER — TRANSITIONAL CARE UNIT VISIT (OUTPATIENT)
Dept: GERIATRICS | Facility: CLINIC | Age: 71
End: 2021-08-03
Payer: MEDICARE

## 2021-08-03 VITALS
BODY MASS INDEX: 44.85 KG/M2 | WEIGHT: 315 LBS | OXYGEN SATURATION: 95 % | TEMPERATURE: 97.6 F | DIASTOLIC BLOOD PRESSURE: 65 MMHG | HEART RATE: 67 BPM | RESPIRATION RATE: 16 BRPM | SYSTOLIC BLOOD PRESSURE: 119 MMHG

## 2021-08-03 DIAGNOSIS — N18.9 CHRONIC KIDNEY DISEASE, UNSPECIFIED CKD STAGE: ICD-10-CM

## 2021-08-03 DIAGNOSIS — I10 ESSENTIAL HYPERTENSION: ICD-10-CM

## 2021-08-03 DIAGNOSIS — R09.82 PND (POST-NASAL DRIP): ICD-10-CM

## 2021-08-03 DIAGNOSIS — E11.65 TYPE 2 DIABETES MELLITUS WITH HYPERGLYCEMIA, WITH LONG-TERM CURRENT USE OF INSULIN (H): ICD-10-CM

## 2021-08-03 DIAGNOSIS — S80.12XS LEG HEMATOMA, LEFT, SEQUELA: Primary | ICD-10-CM

## 2021-08-03 DIAGNOSIS — Z79.4 TYPE 2 DIABETES MELLITUS WITH HYPERGLYCEMIA, WITH LONG-TERM CURRENT USE OF INSULIN (H): ICD-10-CM

## 2021-08-03 DIAGNOSIS — I48.0 PAROXYSMAL ATRIAL FIBRILLATION (H): ICD-10-CM

## 2021-08-03 PROBLEM — L02.619 ABSCESS OF PLANTAR ASPECT OF FOOT: Status: ACTIVE | Noted: 2021-08-03

## 2021-08-03 PROBLEM — Z89.512 S/P BELOW KNEE AMPUTATION, LEFT (H): Status: ACTIVE | Noted: 2018-07-03

## 2021-08-03 PROBLEM — G47.30 SLEEP APNEA: Status: ACTIVE | Noted: 2021-08-03

## 2021-08-03 PROBLEM — N40.0 BPH (BENIGN PROSTATIC HYPERPLASIA): Status: ACTIVE | Noted: 2017-07-21

## 2021-08-03 PROBLEM — M86.9 OSTEOMYELITIS OF RIGHT FOOT (H): Status: ACTIVE | Noted: 2021-08-03

## 2021-08-03 PROBLEM — F34.1 DYSTHYMIC DISORDER: Status: ACTIVE | Noted: 2021-08-03

## 2021-08-03 PROBLEM — R94.31 NONSPECIFIC ABNORMAL ELECTROCARDIOGRAM (ECG) (EKG): Status: ACTIVE | Noted: 2021-08-03

## 2021-08-03 PROBLEM — S72.343A: Status: ACTIVE | Noted: 2018-12-06
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
## 2021-08-03 PROBLEM — T14.8XXA TRAUMATIC HEMATOMA: Status: ACTIVE | Noted: 2021-07-15

## 2021-08-03 PROBLEM — T14.8XXA SUPERFICIAL BRUISING: Status: ACTIVE | Noted: 2021-07-12
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
## 2021-08-03 PROBLEM — E11.610 CHARCOT FOOT DUE TO DIABETES MELLITUS (H): Status: ACTIVE | Noted: 2021-08-03

## 2021-08-03 PROBLEM — E66.01 MORBID OBESITY WITH BMI OF 40.0-44.9, ADULT (H): Status: ACTIVE | Noted: 2018-12-07

## 2021-08-03 PROBLEM — Z47.89 SURGICAL AFTERCARE, MUSCULOSKELETAL SYSTEM: Status: ACTIVE | Noted: 2019-02-28

## 2021-08-03 PROCEDURE — 99309 SBSQ NF CARE MODERATE MDM 30: CPT | Performed by: NURSE PRACTITIONER

## 2021-08-03 NOTE — PROGRESS NOTES
[facility] GERIATRIC SERVICES    Code Status:  FULL CODE   Visit Type TCU follow-up (left BKA hematoma)   Facility: [facility] nursing [facility], TCU      History of Present Illness: Oliver Lawson is a 70 year old male who I am seeing today for follow up on the TCU. Pt recently hospitalized on 7/12/2021.  Past medical history includes uncontrolled diabetes mellitus type 2 with recurrent diabetic foot wound status post left BKA in 2018, CKD, proximal atrial fib on warfarin, complete heart block, status post PPM, RA on methotrexate, class III obesity, COPD, GERD and depression.  Patient recently had a fall onto his hands and knees with his left leg slipping out of his prosthesis.  Patient suffered a contusion of the left stump.  X-ray of the right knee and left amputation site negative for fracture.  Orthotics was consulted and patient fitted with a smaller strength stump .  Patient had some difficulty fitting into his prosthesis.  He underwent a CT which demonstrated a 7.5 x 5 cm hematoma at the amputation stump.  Warfarin was continued.  Hemoglobin remained stable.  Patient was seen by orthotics and it was recommended that he continue in shrinkers until prosthesis can fit in about 7 to 10 days.  Patient is unable to ambulate without prosthesis.  Diabetes mellitus type 2.  His insulin was decreased during hospitalization.  His warfarin was also reduced.    Today patient sitting up in wheelchair. Hx of left BKA.  He does wear prosthetic.  Continues in stump . Continues with PT. Patient with prosthesis continues to complain with pain underneath her prosthesis when ambulating.  A. Fib. rate controlled with metoprolol.  Hypertension satisfactory controlled.  Chronic postnasal drip.  reports better control with Claritin.  Type 2 diabetes.  Patient reports some low blood sugars, facility reports some high blood sugars. Blood sugars in early afternoon low. Pt compensating by eating  snacks. He continues on Lantus and Lispro 16 units TID with sliding scale.  At brunch time patient is receiving about 30 units of short acting insulin which may be contributing to these low blood sugars.  We did discuss his previous routine at home which was 55 units of Lantus and 20 units of lispro 3 times daily.  .  Active Ambulatory Problems     Diagnosis Date Noted     Traumatic hematoma 07/15/2021     ACP (advance care planning) 11/07/2005     Type 2 diabetes mellitus with hyperglycemia (H) 07/19/2021     Renal insufficiency syndrome 01/10/2011     COPD (chronic obstructive pulmonary disease) (H) 12/29/2014     Obesity, morbid (more than 100 lbs over ideal weight or BMI > 40) (H) 12/17/2010     Paroxysmal atrial fibrillation (H) 05/03/2016     Abscess of plantar aspect of foot 08/03/2021     Alcohol dependence (H) 12/13/2010     AV block, 3rd degree (H) 02/07/2016     Bilateral hearing loss 04/25/2016     BPH (benign prostatic hyperplasia) 07/21/2017     Charcot foot due to diabetes mellitus (H) 08/03/2021     Diabetes mellitus without complication (H) 02/07/2005     Fall 07/12/2021     Fall with injury 07/13/2021     GERD (gastroesophageal reflux disease) 03/12/2013     Hypertension 12/13/2010     Low vision, both eyes 02/17/2016     Dysthymic disorder 08/03/2021     Hyperlipidemia 02/07/2005     TGA (transient global amnesia) 11/16/2014     Surgical aftercare, musculoskeletal system 02/28/2019     Superficial bruising 07/12/2021     Spiral fracture of shaft of femur (H) 12/06/2018     Sleep apnea 08/03/2021     S/P placement of cardiac pacemaker 08/29/2016     S/P below knee amputation, left (H) 07/03/2018     Rheumatoid arthritis (H) 01/04/2007     Pre-ulcerative calluses 04/14/2014     Other diseases of lung, not elsewhere classified 11/14/2005     Osteomyelitis of right foot (H) 08/03/2021     Nonspecific abnormal electrocardiogram (ECG) (EKG) 08/03/2021     MRSA (methicillin resistant Staphylococcus  aureus) infection 11/23/2012     Lower limb amputation, great toe (H) 03/02/2011     Morbid obesity with BMI of 40.0-44.9, adult (H) 12/07/2018     Resolved Ambulatory Problems     Diagnosis Date Noted     No Resolved Ambulatory Problems     Past Medical History:   Diagnosis Date     Dyspnea on exertion      Gastro-oesophageal reflux disease      Methicillin resistant Staphylococcus aureus in conditions classified elsewhere and of unspecified site      Morbid obesity (H)      Numbness and tingling      Open wound of foot except toe(s) alone, complicated      Other and unspecified alcohol dependence, unspecified drinking behavior      Other and unspecified hyperlipidemia      Renal insufficiency      Rheumatoid arthritis (H)      Shortness of breath      Type II or unspecified type diabetes mellitus without mention of complication, not stated as uncontrolled      Unspecified pleural effusion        Current Outpatient Medications:      ASPIRIN PO, Take 81 mg by mouth daily, Disp: , Rfl:      Atorvastatin Calcium (LIPITOR PO), Take 80 mg by mouth daily, Disp: , Rfl:      CIPROFLOXACIN PO, Take 500 mg by mouth 2 times daily, Disp: , Rfl:      FOLIC ACID PO, Take 1 mg by mouth daily, Disp: , Rfl:      HYDROcodone-acetaminophen (NORCO) 5-325 MG per tablet, Take 1-2 tablets by mouth every 4 hours as needed for other (Moderate to Severe Pain), Disp: 50 tablet, Rfl: 0     insulin aspart (NovoLOG) injection, Inject Subcutaneous See Admin Instructions 23 UNITS BREAKFAST 25 UNITS LUNCH 25 UNITS DINNER, Disp: , Rfl:      insulin glargine (LANTUS) SOLN 100 UNIT/ML, Inject 62 Units Subcutaneous At Bedtime, Disp: , Rfl:      LISINOPRIL PO, Take 40 mg by mouth daily TAKE 0.5 TAB DAILY [20 MG DAILY], Disp: , Rfl:      loratadine (CLARITIN) 10 MG tablet, Take 10 mg by mouth daily, Disp: , Rfl:      Methotrexate, Anti-Rheumatic, (RHEUMATREX PO), Take 2.5 mg by mouth once a week TAKE 8 TABS ONCE A WEEK, Disp: , Rfl:      Metoprolol  Succinate (TOPROL XL PO), Take 50 mg by mouth daily, Disp: , Rfl:      NIFEdipine osmotic (NIFEDICAL XL) 30 MG 24 hr tablet, Take 30 mg by mouth daily, Disp: , Rfl:      Nitroglycerin (NITROSTAT SL), Place 0.4 mg under the tongue every 5 minutes as needed, Disp: , Rfl:      Pantoprazole Sodium (PROTONIX PO), Take 40 mg by mouth every morning (before breakfast), Disp: , Rfl:      senna-docusate (SENOKOT-S;PERICOLACE) 8.6-50 MG per tablet, Take 2 tablets by mouth daily as needed for constipation Take while on oral narcotics to prevent or treat constipation., Disp: 30 tablet, Rfl: 0     Sertraline HCl (ZOLOFT PO), Take 100 mg by mouth daily, Disp: , Rfl:      Sulfamethoxazole-Trimethoprim (BACTRIM DS PO), Take 1 tablet by mouth 2 times daily, Disp: , Rfl:   Allergies   Allergen Reactions     Angiotensin Receptor Blockers      ARB-Angiotensin Receptor Antagonist     Contrast Dye      Diatrizoate Other (See Comments)     Patient recommended by a past doctor to not use Contrast Dye due to body chemistry    -- seriously? Someone check into this     Ace Inhibitors Cough     Changed to ARB.        All Meds and Allergies reviewed in the record at the facility.     REVIEW OF SYSTEMS:   Review of Systems  No fevers or chills. No headache, lightheadedness or dizziness.  Chronic stuffiness.  No SOB, chest pains or palpitations. Appetite is good. No nausea, vomiting, constipation or diarrhea. No dysuria, frequency, burning or pain with urination.  Patient continues in stump  and is now using his prosthesis.  Pain well controlled Tylenol.  Otherwise review of systems are negative.     PHYSICAL EXAMINATION:  Physical Exam     Vital signs: /65   Pulse 67   Temp 97.6  F (36.4  C)   Resp 16   Wt 145.9 kg (321 lb 9.6 oz)   SpO2 95%   BMI 44.85 kg/m    General: Awake, Alert, oriented x3, appropriately, follows simple commands, conversant  HEENT: Pink conjunctiva, anicteric sclerae, moist oral mucosa.  Nasal  stuffiness.  NECK: Supple, without any lymphadenopathy, or masses  CVS:  S1  S2, without murmur or gallop.   LUNG: Clear to auscultation, No wheezes, rales or rhonci.  BACK: No kyphosis of the thoracic spine  ABDOMEN: Soft, obese, nontender to palpation, with positive bowel sounds  EXTREMITIES: Moves both upper and lower extremities, no pedal edema, no calf tenderness.  Left BKA with prosthetic on.  SKIN: Warm and dry.   NEUROLOGIC: Intact, pulses palpable on the RLE.   PSYCHIATRIC: Cognition intact      Labs:  All labs reviewed in the nursing home record.  Recent Results (from the past 48 hour(s))   INR (External Result)    Collection Time: 08/02/21 12:00 PM   Result Value Ref Range    INR (External) 3.3 (A) 0.9 - 1.1        Assessment/Plan:    ICD-10-CM    1. Leg hematoma, left, sequela  S80.12XS  continue with icing and elevation.  Continue with stump .  Patient now to wear his prosthesis however has increased pain when using.   2. Pain management  R52  continue with Tylenol and ice.   3. Type 2 diabetes mellitus with hyperglycemia, with long-term current use of insulin (H)  E11.65 Discontinue sliding scale.  Increase Lantus to 48 units at HS. continue lispro 16 units 3 times daily.  Continue to monitor make adjustments.              4 Atrial fib   Rate controlled with metoprolol   6. Essential hypertension   satisfactory control.     7. Postnasal drip   Continues on Claritin and as Astelin.          Pt will need a standard wheelchair upon discharge. He has mobility limitations impairing his ability to participate in ADLS such toileting, feeding, dressing, grooming and bathing in customary locations in the home without wheelchair. Pt with left BKA with recent fall with hematoma to stump. Pt also has limitations to RLE. He wears a knee hindge splint. Patient/cargiver are able to safely use a manual wheelchair. Pt's functional mobility deficit can be sufficiently resolved by the use of a manual wheel chair.  Patients mobility can't be safely resolved by use of a cane, walker or crutches. Pt will need this for life.     This note has been dictated using voice recognition software. Any grammatical or context distortions are unintentional and inherent to the software    Electronically signed by: Tram Guy CNP

## 2021-08-04 ENCOUNTER — ANTICOAGULATION THERAPY VISIT (OUTPATIENT)
Dept: ANTICOAGULATION | Facility: CLINIC | Age: 71
End: 2021-08-04

## 2021-08-04 DIAGNOSIS — I48.0 PAROXYSMAL ATRIAL FIBRILLATION (H): Primary | ICD-10-CM

## 2021-08-04 LAB — INR (EXTERNAL): 2.5 (ref 0.9–1.1)

## 2021-08-04 NOTE — PROGRESS NOTES
ANTICOAGULATION MANAGEMENT     Oliver Lawson 70 year old male is on warfarin with therapeutic INR result. (Goal INR 2.0-3.0)    Recent labs: (last 7 days)     08/04/21  0900   INR 2.5*       ASSESSMENT     Source(s): Chart Review and Home Care/Facility Nurse       Warfarin doses taken: Missed dose(s) may be affecting INR    Diet: No new diet changes identified    New illness, injury, or hospitalization: No    Medication/supplement changes: Insulin dose increased    Signs or symptoms of bleeding or clotting: No    Previous INR: Supratherapeutic    Additional findings: None     PLAN     Recommended plan for ongoing change(s) affecting INR     Dosing Instructions: Continue your current warfarin dose with next INR in 5 days       Summary  As of 8/4/2021    Full warfarin instructions:  5 mg every Mon, Wed, Fri; 7.5 mg all other days   Next INR check:  8/9/2021             Telephone call with Kerry nurse at [facility] facility who verbalizes understanding and agrees to plan and who agrees to plan and repeated back plan correctly    Orders given to  Homecare nurse/facility to recheck    Education provided: Importance of therapeutic range and Importance of taking warfarin as instructed    Plan made per [facility] anticoagulation protocol    Saritha Velasquez RN  Anticoagulation Clinic  8/4/2021    _______________________________________________________________________     Anticoagulation Episode Summary     Current INR goal:  2.0-3.0   TTR:  18.4 % (6 d)   Target end date:  Indefinite   Send INR reminders to:  [facility] (TCU/LTC/[facility])    Indications    Paroxysmal atrial fibrillation (H) [I48.0]           Comments:           Anticoagulation Care Providers     Provider Role Specialty Phone number    Kamran Stern DO Referring Family Medicine [phone]

## 2021-08-04 NOTE — PROGRESS NOTES
8/4/21 - [facility]  INR - 2.5    Warfarin dose 7.5mg daily    Questionaire - Lantus increased to 48 units.  One missed dose on 8/2 with 2.5mg.

## 2021-08-05 ENCOUNTER — TRANSITIONAL CARE UNIT VISIT (OUTPATIENT)
Dept: GERIATRICS | Facility: CLINIC | Age: 71
End: 2021-08-05
Payer: MEDICARE

## 2021-08-05 VITALS
DIASTOLIC BLOOD PRESSURE: 61 MMHG | WEIGHT: 315 LBS | SYSTOLIC BLOOD PRESSURE: 123 MMHG | BODY MASS INDEX: 44.1 KG/M2 | HEIGHT: 71 IN | HEART RATE: 63 BPM | RESPIRATION RATE: 18 BRPM | OXYGEN SATURATION: 98 % | TEMPERATURE: 97.2 F

## 2021-08-05 DIAGNOSIS — I48.0 PAROXYSMAL ATRIAL FIBRILLATION (H): ICD-10-CM

## 2021-08-05 DIAGNOSIS — Z79.4 TYPE 2 DIABETES MELLITUS WITH HYPERGLYCEMIA, WITH LONG-TERM CURRENT USE OF INSULIN (H): ICD-10-CM

## 2021-08-05 DIAGNOSIS — E11.65 TYPE 2 DIABETES MELLITUS WITH HYPERGLYCEMIA, WITH LONG-TERM CURRENT USE OF INSULIN (H): ICD-10-CM

## 2021-08-05 DIAGNOSIS — Z89.512 HX OF BKA, LEFT (H): ICD-10-CM

## 2021-08-05 DIAGNOSIS — R09.82 PND (POST-NASAL DRIP): ICD-10-CM

## 2021-08-05 DIAGNOSIS — S80.12XS LEG HEMATOMA, LEFT, SEQUELA: Primary | ICD-10-CM

## 2021-08-05 DIAGNOSIS — I10 ESSENTIAL HYPERTENSION: ICD-10-CM

## 2021-08-05 PROCEDURE — 99316 NF DSCHRG MGMT 30 MIN+: CPT | Performed by: NURSE PRACTITIONER

## 2021-08-05 RX ORDER — WARFARIN SODIUM 7.5 MG/1
7.5 TABLET ORAL DAILY
COMMUNITY

## 2021-08-05 ASSESSMENT — MIFFLIN-ST. JEOR: SCORE: 2230.9

## 2021-08-06 NOTE — PROGRESS NOTES
[facility] GERIATRIC SERVICES    Code Status:  FULL CODE   Visit Type discharge from the nursing home  Facility: [facility] skilled nursing facility, TCU      History of Present Illness: Oliver Lawson is a 70 year old male who I am seeing today for discharge from the TCU. Pt recently hospitalized on 7/12/2021.  Past medical history includes uncontrolled diabetes mellitus type 2 with recurrent diabetic foot wound status post left BKA in 2018, CKD, proximal atrial fib on warfarin, complete heart block, status post PPM, RA on methotrexate, class III obesity, COPD, GERD and depression.  Patient recently had a fall onto his hands and knees with his left leg slipping out of his prosthesis.  Patient suffered a contusion of the left stump.  X-ray of the right knee and left amputation site negative for fracture.  Orthotics was consulted and patient fitted with a smaller strength stump .  Patient had some difficulty fitting into his prosthesis.  He underwent a CT which demonstrated a 7.5 x 5 cm hematoma at the amputation stump.  Warfarin was continued.  Hemoglobin remained stable.  Patient was seen by orthotics and it was recommended that he continue in shrinkers until prosthesis can fit in about 7 to 10 days.  Patient is unable to ambulate without prosthesis.  Diabetes mellitus type 2.  His insulin was decreased during hospitalization.  His warfarin was also reduced.    Today patient sitting up in wheelchair.  Patient wife present on exam.  Hx of left BKA.  Patient with recent fall sustaining hematoma to left BKA.  He was no longer able to fit into his current prosthetic.  He was seen by orthotics.  He has received a new prosthetic socket.  He has been refitted for stump .  Today reports pain is improving however with prolonged standing or walking it is painful.  He continues on hydrocodone for pain.  He wears a knee hinge splint to the right leg.  Hematoma they are fading.  Underlying atrial  fib rate controlled with metoprolol.  He is on chronic anticoagulation with Coumadin.  INRs have been managed per the Coumadin.  Yesterday INR 2.5.  Is a follow-up on 8/9/2021.  Diabetes mellitus type 2.  Uncontrolled.  His Lantus and aspart was decreased during hospitalization secondary to poor oral intake.  I have been making adjustments and titrating upward as appetite has improved.  Currently continues on aspart 16 units 3 times daily with meals and Lantus 52 units at at bedtime.  I have discontinued his sliding scale because he was bottoming out with his scheduled aspart and sliding scale together.  He does continue with Dex con and I have asked him to log his blood sugars to present with primary provider.  He will need ongoing adjustments as he weans off his prednisone.  Patient with underlying RA.  Hypertension satisfactory controlled.  Chronic kidney disease stable.  Patient with a history of seasonal allergies.  He was taken off his Zyrtec and transition to Claritin.  He felt like this was more helpful.  He is on as Astelin.  He does occasionally use Flonase at home.  Obstructive sleep apnea on CPAP.      .  Active Ambulatory Problems     Diagnosis Date Noted     Traumatic hematoma 07/15/2021     ACP (advance care planning) 11/07/2005     Type 2 diabetes mellitus with hyperglycemia (H) 07/19/2021     Renal insufficiency syndrome 01/10/2011     COPD (chronic obstructive pulmonary disease) (H) 12/29/2014     Obesity, morbid (more than 100 lbs over ideal weight or BMI > 40) (H) 12/17/2010     Paroxysmal atrial fibrillation (H) 05/03/2016     Abscess of plantar aspect of foot 08/03/2021     Alcohol dependence (H) 12/13/2010     AV block, 3rd degree (H) 02/07/2016     Bilateral hearing loss 04/25/2016     BPH (benign prostatic hyperplasia) 07/21/2017     Charcot foot due to diabetes mellitus (H) 08/03/2021     Diabetes mellitus without complication (H) 02/07/2005     Fall 07/12/2021     Fall with injury  07/13/2021     GERD (gastroesophageal reflux disease) 03/12/2013     Hypertension 12/13/2010     Low vision, both eyes 02/17/2016     Dysthymic disorder 08/03/2021     Hyperlipidemia 02/07/2005     TGA (transient global amnesia) 11/16/2014     Surgical aftercare, musculoskeletal system 02/28/2019     Superficial bruising 07/12/2021     Spiral fracture of shaft of femur (H) 12/06/2018     Sleep apnea 08/03/2021     S/P placement of cardiac pacemaker 08/29/2016     S/P below knee amputation, left (H) 07/03/2018     Rheumatoid arthritis (H) 01/04/2007     Pre-ulcerative calluses 04/14/2014     Other diseases of lung, not elsewhere classified 11/14/2005     Osteomyelitis of right foot (H) 08/03/2021     Nonspecific abnormal electrocardiogram (ECG) (EKG) 08/03/2021     MRSA (methicillin resistant Staphylococcus aureus) infection 11/23/2012     Lower limb amputation, great toe (H) 03/02/2011     Morbid obesity with BMI of 40.0-44.9, adult (H) 12/07/2018     Resolved Ambulatory Problems     Diagnosis Date Noted     No Resolved Ambulatory Problems     Past Medical History:   Diagnosis Date     Dyspnea on exertion      Gastro-oesophageal reflux disease      Methicillin resistant Staphylococcus aureus in conditions classified elsewhere and of unspecified site      Morbid obesity (H)      Numbness and tingling      Open wound of foot except toe(s) alone, complicated      Other and unspecified alcohol dependence, unspecified drinking behavior      Other and unspecified hyperlipidemia      Renal insufficiency      Rheumatoid arthritis (H)      Shortness of breath      Type II or unspecified type diabetes mellitus without mention of complication, not stated as uncontrolled      Unspecified pleural effusion        Current Outpatient Medications:      warfarin ANTICOAGULANT (COUMADIN) 7.5 MG tablet, Take 7.5 mg by mouth daily Patient takes 7.5 mg on Thursday, Saturday, Sunday and 5 mg on Monday, Wednesday.  Follow-up INR on  8/9/2021., Disp: , Rfl:      ASPIRIN PO, Take 81 mg by mouth daily, Disp: , Rfl:      Atorvastatin Calcium (LIPITOR PO), Take 80 mg by mouth daily, Disp: , Rfl:      FOLIC ACID PO, Take 1 mg by mouth daily, Disp: , Rfl:      HYDROcodone-acetaminophen (NORCO) 5-325 MG per tablet, Take 1-2 tablets by mouth every 4 hours as needed for other (Moderate to Severe Pain), Disp: 50 tablet, Rfl: 0     insulin aspart (NovoLOG) injection, Inject 16 Units Subcutaneous 3 times daily (with meals) 23 UNITS BREAKFAST 25 UNITS LUNCH 25 UNITS DINNER , Disp: , Rfl:      insulin glargine (LANTUS) SOLN 100 UNIT/ML, Inject 52 Units Subcutaneous At Bedtime , Disp: , Rfl:      LISINOPRIL PO, Take 40 mg by mouth daily TAKE 0.5 TAB DAILY [20 MG DAILY], Disp: , Rfl:      loratadine (CLARITIN) 10 MG tablet, Take 10 mg by mouth daily, Disp: , Rfl:      Methotrexate, Anti-Rheumatic, (RHEUMATREX PO), Take 2.5 mg by mouth once a week TAKE 8 TABS ONCE A WEEK, Disp: , Rfl:      Metoprolol Succinate (TOPROL XL PO), Take 50 mg by mouth daily, Disp: , Rfl:      NIFEdipine osmotic (NIFEDICAL XL) 30 MG 24 hr tablet, Take 30 mg by mouth daily, Disp: , Rfl:      Nitroglycerin (NITROSTAT SL), Place 0.4 mg under the tongue every 5 minutes as needed, Disp: , Rfl:      Pantoprazole Sodium (PROTONIX PO), Take 40 mg by mouth every morning (before breakfast), Disp: , Rfl:      senna-docusate (SENOKOT-S;PERICOLACE) 8.6-50 MG per tablet, Take 2 tablets by mouth daily as needed for constipation Take while on oral narcotics to prevent or treat constipation., Disp: 30 tablet, Rfl: 0     Sertraline HCl (ZOLOFT PO), Take 100 mg by mouth daily, Disp: , Rfl:   Allergies   Allergen Reactions     Angiotensin Receptor Blockers      ARB-Angiotensin Receptor Antagonist     Contrast Dye      Diatrizoate Other (See Comments)     Patient recommended by a past doctor to not use Contrast Dye due to body chemistry    -- seriously? Someone check into this     Ace Inhibitors Cough      "Changed to ARB.        All Meds and Allergies reviewed in the record at the facility.     REVIEW OF SYSTEMS:   Review of Systems  No fevers or chills. No headache, lightheadedness or dizziness.  Chronic stuffiness.  No SOB, chest pains or palpitations. Appetite is good. No nausea, vomiting, constipation or diarrhea. No dysuria, frequency, burning or pain with urination.  Patient continues in stump  and is now using his prosthesis.  Pain well controlled Tylenol.  Otherwise review of systems are negative.     PHYSICAL EXAMINATION:  Physical Exam     Vital signs: /61   Pulse 63   Temp 97.2  F (36.2  C)   Resp 18   Ht 1.803 m (5' 11\")   Wt 145.9 kg (321 lb 9.6 oz)   SpO2 98%   BMI 44.85 kg/m    General: Awake, Alert, oriented x3, appropriately, follows simple commands, conversant  HEENT: Pink conjunctiva, anicteric sclerae, moist oral mucosa.  Nasal stuffiness.  NECK: Supple, without any lymphadenopathy, or masses  CVS:  S1  S2, without murmur or gallop.   LUNG: Clear to auscultation, No wheezes, rales or rhonci.  BACK: No kyphosis of the thoracic spine  ABDOMEN: Soft, obese, nontender to palpation, with positive bowel sounds  EXTREMITIES: Moves both upper and lower extremities, no pedal edema, no calf tenderness.  Left BKA with prosthetic on.  SKIN: Warm and dry.   NEUROLOGIC: Intact, pulses palpable on the RLE.   PSYCHIATRIC: Cognition intact      Labs:  All labs reviewed in the nursing home record.  Recent Results (from the past 48 hour(s))   INR (External Result)    Collection Time: 08/04/21  9:00 AM   Result Value Ref Range    INR (External) 2.5 (A) 0.9 - 1.1        Assessment/Plan:    ICD-10-CM    1. Leg hematoma, left, sequela  S80.12XS  continue with icing and elevation.  Continue with stump .  Patient now to wear his prosthesis however has increased pain when using.   2. Pain management  R52  continue with Tylenol and ice.   3. Type 2 diabetes mellitus with hyperglycemia, with " long-term current use of insulin (H)  E11.65 Discontinue sliding scale.  Increase Lantus to 52 units at HS. continue lispro 16 units 3 times daily.  Continue to monitor and log blood sugars to review with primary care provider.  He will need continued adjustments as he weans off his prednisone.              4 Atrial fib   Rate controlled with metoprolol   6. Essential hypertension   satisfactory control.     7. Postnasal drip   Continues on Claritin and as Astelin.        Okay to DC home with current meds and treatments.  Home PT, OT, home health aide and RN for INR draw.  Follow-up primary care provider in 1 week.    DISCHARGE PLAN/FACE TO FACE:  I certify that this patient is under my care and that I, or a nurse practitioner or physician's assistant working with me, had a face-to-face encounter that meets the physician face-to-face encounter requirements with this patient.       I certify that, based on my findings, the following services are medically necessary home health services.    My clinical findings support the need for the above skilled services.    This patient is homebound because: Recent fall with hematoma to left BKA now with new prosthetic socket.  Total time spent for this visit was 35 minutes with greater than 50% of time spent face-to-face with patient and his wife reviewing discharge medications, changes in insulins as well as ongoing management and monitoring, reviewing home care services and follow-ups.    The patient is, or has been, under my care and I have initiated the establishment of the plan of care. This patient will be followed by a physician who will periodically review the plan of care.        This note has been dictated using voice recognition software. Any grammatical or context distortions are unintentional and inherent to the software    Electronically signed by: Tram Guy CNP

## 2021-08-12 ENCOUNTER — DOCUMENTATION ONLY (OUTPATIENT)
Dept: ANTICOAGULATION | Facility: CLINIC | Age: 71
End: 2021-08-12

## 2021-08-12 DIAGNOSIS — I48.0 PAROXYSMAL ATRIAL FIBRILLATION (H): Primary | ICD-10-CM

## 2021-08-12 NOTE — PROGRESS NOTES
ANTICOAGULATION  MANAGEMENT    Oliver Lawson is being discharged from the [facility] Anticoagulation Management Program ([facility]).    Reason for discharge: discharged from [facility]; returning to pre-admission warfarin management    Anticoagulation episode resolved    If patient needs warfarin management in the future, please send a new referral    Reynaldo Pritchett RN

## 2021-12-25 ENCOUNTER — HEALTH MAINTENANCE LETTER (OUTPATIENT)
Age: 71
End: 2021-12-25

## 2022-04-16 ENCOUNTER — HEALTH MAINTENANCE LETTER (OUTPATIENT)
Age: 72
End: 2022-04-16

## 2022-05-05 LAB — INR (EXTERNAL): 1.3 (ref 0.9–1.1)

## 2022-05-06 ENCOUNTER — ANTICOAGULATION THERAPY VISIT (OUTPATIENT)
Dept: ANTICOAGULATION | Facility: CLINIC | Age: 72
End: 2022-05-06
Payer: MEDICARE

## 2022-05-06 DIAGNOSIS — I48.91 A-FIB (H): Primary | ICD-10-CM

## 2022-05-06 LAB — INR (EXTERNAL): 1.1 (ref 0.9–1.1)

## 2022-05-06 NOTE — PROGRESS NOTES
ANTICOAGULATION MANAGEMENT     Oliver Lawson 71 year old male is on warfarin with subtherapeutic INR result. (Goal INR 2.0-3.0)    Recent labs: (last 7 days)     05/06/22  0800   INR 1.1       ASSESSMENT       Source(s): Chart Review and Home Care/Facility Nurse       Warfarin doses taken: Warfarin taken as instructed    Diet: No new diet changes identified    New illness, injury, or hospitalization: Yes: s/p Right knee replacement on 5/3/22.      Medication/supplement changes: oxycodone as needed use No interaction anticipated    Signs or symptoms of bleeding or clotting: No    Previous INR: Subtherapeutic    Additional findings: None       PLAN     Recommended plan for temporary change(s) affecting INR     Dosing Instructions: booster dose then continue your current warfarin dose with next INR in 4 days       Summary  As of 5/6/2022    Full warfarin instructions:  5/6: 15 mg; Otherwise 7.5 mg every day   Next INR check:  5/10/2022             Telephone call with [facility] home care/facility nurse who agrees to plan and repeated back plan correctly    Orders given to  Homecare nurse/facility to recheck    Education provided: Goal range and significance of current result, Importance of therapeutic range, Importance of following up at instructed interval, Importance of taking warfarin as instructed, Monitoring for clotting signs and symptoms and When to seek medical attention/emergency care    Plan made per [facility] anticoagulation protocol    Farhana Arciniega RN  Anticoagulation Clinic  5/6/2022    _______________________________________________________________________     Anticoagulation Episode Summary     Current INR goal:  2.0-3.0   TTR:  --   Target end date:  Indefinite   Send INR reminders to:  [facility] (TCU/LTC/care home)    Indications    A-fib (H) [I48.91]           Comments:  Jasvir Frye [phone]         Anticoagulation Care Providers     Provider Role Specialty Phone number     Kamran Stern, DO Referring Family Medicine [phone]

## 2022-05-06 NOTE — PROGRESS NOTES
Received fax from [facility] with INR result of 1.1 from 05/06.    Pt is new to [facility] and is on warfarin for longstanding a. Fib. He is managed outpatient through [facility]. Home dose is 7.5mg daily.    Pt is s/p R knee replacement on 05/03 at [facility]. He was discharged 05/05 without bridging.    Unclear what dose patient received of warfarin on 05/05/2022. Will need to clarify with [facility] when they call.

## 2022-05-09 ENCOUNTER — LAB REQUISITION (OUTPATIENT)
Dept: LAB | Facility: CLINIC | Age: 72
End: 2022-05-09
Payer: MEDICARE

## 2022-05-09 ENCOUNTER — TRANSITIONAL CARE UNIT VISIT (OUTPATIENT)
Dept: GERIATRICS | Facility: CLINIC | Age: 72
End: 2022-05-09
Payer: MEDICARE

## 2022-05-09 VITALS
SYSTOLIC BLOOD PRESSURE: 142 MMHG | OXYGEN SATURATION: 95 % | WEIGHT: 315 LBS | TEMPERATURE: 97 F | RESPIRATION RATE: 18 BRPM | DIASTOLIC BLOOD PRESSURE: 73 MMHG | HEIGHT: 73 IN | HEART RATE: 87 BPM | BODY MASS INDEX: 41.75 KG/M2

## 2022-05-09 DIAGNOSIS — E11.40 TYPE 2 DIABETES MELLITUS WITH DIABETIC NEUROPATHY, UNSPECIFIED (H): ICD-10-CM

## 2022-05-09 DIAGNOSIS — M17.11 PRIMARY OSTEOARTHRITIS OF RIGHT KNEE: Primary | ICD-10-CM

## 2022-05-09 DIAGNOSIS — Z89.512 HX OF BKA, LEFT (H): ICD-10-CM

## 2022-05-09 DIAGNOSIS — Z96.651 S/P TOTAL KNEE ARTHROPLASTY, RIGHT: ICD-10-CM

## 2022-05-09 DIAGNOSIS — I10 ESSENTIAL HYPERTENSION: ICD-10-CM

## 2022-05-09 DIAGNOSIS — Z79.4 TYPE 2 DIABETES MELLITUS WITH HYPERGLYCEMIA, WITH LONG-TERM CURRENT USE OF INSULIN (H): ICD-10-CM

## 2022-05-09 DIAGNOSIS — I48.0 PAROXYSMAL ATRIAL FIBRILLATION (H): ICD-10-CM

## 2022-05-09 DIAGNOSIS — E11.65 TYPE 2 DIABETES MELLITUS WITH HYPERGLYCEMIA, WITH LONG-TERM CURRENT USE OF INSULIN (H): ICD-10-CM

## 2022-05-09 DIAGNOSIS — N18.9 CHRONIC KIDNEY DISEASE, UNSPECIFIED CKD STAGE: ICD-10-CM

## 2022-05-09 PROBLEM — K42.9 UMBILICAL HERNIA: Status: ACTIVE | Noted: 2022-01-03

## 2022-05-09 PROCEDURE — 99310 SBSQ NF CARE HIGH MDM 45: CPT | Performed by: NURSE PRACTITIONER

## 2022-05-09 RX ORDER — ACETAMINOPHEN 500 MG
1000 TABLET ORAL 3 TIMES DAILY
COMMUNITY
Start: 2024-10-02

## 2022-05-09 RX ORDER — LOSARTAN POTASSIUM 50 MG/1
50 TABLET ORAL DAILY
COMMUNITY
Start: 2024-10-02

## 2022-05-09 RX ORDER — OXYCODONE HYDROCHLORIDE 5 MG/1
5-10 TABLET ORAL EVERY 4 HOURS PRN
COMMUNITY
End: 2024-10-02

## 2022-05-09 RX ORDER — FOLIC ACID 1 MG/1
1 TABLET ORAL DAILY
COMMUNITY
Start: 2024-10-02

## 2022-05-09 RX ORDER — FAMOTIDINE 20 MG/1
20 TABLET, FILM COATED ORAL 2 TIMES DAILY
COMMUNITY
Start: 2024-10-02

## 2022-05-09 RX ORDER — ALBUTEROL SULFATE 90 UG/1
2 AEROSOL, METERED RESPIRATORY (INHALATION) EVERY 6 HOURS PRN
COMMUNITY
End: 2024-10-02

## 2022-05-09 RX ORDER — MULTIPLE VITAMINS W/ MINERALS TAB 9MG-400MCG
1 TAB ORAL DAILY
COMMUNITY
Start: 2024-10-02

## 2022-05-09 RX ORDER — AMLODIPINE BESYLATE 10 MG/1
10 TABLET ORAL DAILY
COMMUNITY
End: 2024-10-02

## 2022-05-09 RX ORDER — CETIRIZINE HYDROCHLORIDE 10 MG/1
10 TABLET ORAL DAILY
COMMUNITY
End: 2024-10-02

## 2022-05-09 NOTE — PROGRESS NOTES
[facility] GERIATRIC SERVICES    Code Status:  FULL CODE   Visit Type:   Chief Complaint   Patient presents with     Hospital F/U     Tcu Admission     Facility:  [facility] ([facility]) [[id number]]           Transitional Care Course: Oliver Lawson is a 71 year old male who I am seeing today for admit to the TCU.  Patient recently hospitalized on 5/3/2022 at [facility] secondary to osteoarthritis of the right knee who underwent right TKA.  Past medical history includes diabetes mellitus type 2 with severe sensory neuropathy of the feet, hyperlipidemia, sleep apnea on CPAP, rheumatoid arthritis, hypertension, GERD, COPD, AV block third-degree status post pacemaker, proximal atrial fib, BPH, morbid obesity and below the knee amputation.  Preoperative x-rays revealed arthritic changes in the right knee.  Patient status post right TKA.  He tolerated the procedure well.  Postoperatively he did have some pain management issues.  Now tolerating only Tylenol.  He reports today he did not tolerate oxycodone very well.  Hemoglobin stable at 12.4 postoperatively.  History of atrial fib.  Continues on aspirin and Coumadin.  INR is managed per the Coumadin clinic.  Diabetes mellitus type 2.  He does have an insulin pump.  Sleep apnea on CPAP.  Hypertension satisfactory.    On today's visit he is sitting up in a wheelchair.  He continues with a prosthesis to the left lower extremity.  Incision to the right knee covered with bandage.  He does have some strikethrough at the distal aspect.  Knee is very swollen.  He does have slight redness slight warmth.  Nursing staff to update orthopedics.  Currently afebrile.  He reports he was unable to tolerate oxycodone.  Continues on Tylenol.  He is weightbearing as tolerated.  He is on aspirin and Coumadin for DVT prophylactics.  INR is managed per the Coumadin clinic.  Underlying diabetes mellitus type 2.  Today he reports that his sliding scale was different at home.  He does  have a insulin pump in place.  He was on a 50 point sliding scale at home.  I will resume this.  He reports he is voiding adequately.  He is having some difficulty with constipation.  We did discuss stool softener however he would like to first try some prune juice.  He is emptying his bladder.  Blood pressures appear satisfactory.  He denies any shortness of breath or chest pain.          Active Ambulatory Problems     Diagnosis Date Noted     Traumatic hematoma 07/15/2021     ACP (advance care planning) 11/07/2005     Type 2 diabetes mellitus with hyperglycemia (H) 07/19/2021     Renal insufficiency syndrome 01/10/2011     COPD (chronic obstructive pulmonary disease) (H) 12/29/2014     Obesity, morbid (more than 100 lbs over ideal weight or BMI > 40) (H) 12/17/2010     Paroxysmal atrial fibrillation (H) 05/03/2016     Abscess of plantar aspect of foot 08/03/2021     Alcohol dependence (H) 12/13/2010     AV block, 3rd degree (H) 02/07/2016     Bilateral hearing loss 04/25/2016     BPH (benign prostatic hyperplasia) 07/21/2017     Charcot foot due to diabetes mellitus (H) 08/03/2021     Diabetes mellitus without complication (H) 02/07/2005     Fall 07/12/2021     Fall with injury 07/13/2021     GERD (gastroesophageal reflux disease) 03/12/2013     Hypertension 12/13/2010     Low vision, both eyes 02/17/2016     Dysthymic disorder 08/03/2021     Hyperlipidemia 02/07/2005     TGA (transient global amnesia) 11/16/2014     Surgical aftercare, musculoskeletal system 02/28/2019     Superficial bruising 07/12/2021     Spiral fracture of shaft of femur (H) 12/06/2018     Sleep apnea 08/03/2021     S/P placement of cardiac pacemaker 08/29/2016     S/P below knee amputation, left (H) 07/03/2018     Rheumatoid arthritis (H) 01/04/2007     Pre-ulcerative calluses 04/14/2014     Other diseases of lung, not elsewhere classified 11/14/2005     Osteomyelitis of right foot (H) 08/03/2021     Nonspecific abnormal electrocardiogram  (ECG) (EKG) 08/03/2021     MRSA (methicillin resistant Staphylococcus aureus) infection 11/23/2012     Lower limb amputation, great toe (H) 03/02/2011     Morbid obesity with BMI of 40.0-44.9, adult (H) 12/07/2018     A-fib (H) 05/06/2022     Umbilical hernia 01/03/2022     Osteoarthritis of right knee 05/03/2022     Resolved Ambulatory Problems     Diagnosis Date Noted     No Resolved Ambulatory Problems     Past Medical History:   Diagnosis Date     Dyspnea on exertion      Gastro-oesophageal reflux disease      Methicillin resistant Staphylococcus aureus in conditions classified elsewhere and of unspecified site      Morbid obesity (H)      Numbness and tingling      Open wound of foot except toe(s) alone, complicated      Other and unspecified alcohol dependence, unspecified drinking behavior      Other and unspecified hyperlipidemia      Renal insufficiency      Rheumatoid arthritis (H)      Shortness of breath      Type II or unspecified type diabetes mellitus without mention of complication, not stated as uncontrolled      Unspecified pleural effusion        Current Outpatient Medications:      acetaminophen (TYLENOL) 500 MG tablet, Take 1,000 mg by mouth every 6 hours as needed for mild pain, Disp: , Rfl:      albuterol (PROAIR HFA/PROVENTIL HFA/VENTOLIN HFA) 108 (90 Base) MCG/ACT inhaler, Inhale 2 puffs into the lungs every 6 hours as needed for shortness of breath / dyspnea or wheezing, Disp: , Rfl:      amLODIPine (NORVASC) 10 MG tablet, Take 10 mg by mouth daily, Disp: , Rfl:      Atorvastatin Calcium (LIPITOR PO), Take 80 mg by mouth daily, Disp: , Rfl:      cetirizine (ZYRTEC) 10 MG tablet, Take 10 mg by mouth daily, Disp: , Rfl:      famotidine (PEPCID) 20 MG tablet, Take 20 mg by mouth 2 times daily, Disp: , Rfl:      folic acid (FOLVITE) 1 MG tablet, Take 1 mg by mouth daily, Disp: , Rfl:      insulin aspart (NOVOLOG PEN) 100 UNIT/ML pen, Inject 16 Units Subcutaneous 3 times daily (with meals) Give  16 units at breakfast in the afternoon.  12 units prelunch., Disp: , Rfl:      insulin aspart (NOVOLOG VIAL) 100 UNITS/ML vial, Inject Subcutaneous 3 times daily (with meals) 16 UNITS BREAKFAST 12 UNITS LUNCH 16 UNITS DINNER And give TID Per Sliding Scale; If Blood Sugar is 150 to 199, give 3 Units. If Blood Sugar is 200 to 299, give 6 Units. If Blood Sugar is 300 to 399, give 9 Units. Special Instructions: blood glucose dose < 70. see hypoglycemia protocol  no insulin give prandial insulin if ordered, Disp: , Rfl:      insulin glargine (LANTUS) SOLN 100 UNIT/ML, Inject 45 Units Subcutaneous At Bedtime, Disp: , Rfl:      losartan (COZAAR) 50 MG tablet, Take 50 mg by mouth daily, Disp: , Rfl:      Metoprolol Succinate (TOPROL XL PO), Take 50 mg by mouth daily, Disp: , Rfl:      multivitamin w/minerals (THERA-VIT-M) tablet, Take 1 tablet by mouth daily, Disp: , Rfl:      oxyCODONE (ROXICODONE) 5 MG tablet, Take 5-10 mg by mouth every 4 hours as needed for severe pain, Disp: , Rfl:      Sertraline HCl (ZOLOFT PO), Take 150 mg by mouth daily, Disp: , Rfl:      warfarin ANTICOAGULANT (COUMADIN) 7.5 MG tablet, Take 7.5 mg by mouth daily Patient takes 7.5 mg on Thursday, Saturday, Sunday and 5 mg on Monday, Wednesday.  Follow-up INR on 8/9/2021., Disp: , Rfl:   Allergies   Allergen Reactions     Angiotensin Receptor Blockers      ARB-Angiotensin Receptor Antagonist     Contrast Dye      Diatrizoate Other (See Comments)     Patient recommended by a past doctor to not use Contrast Dye due to body chemistry    -- seriously? Someone check into this     Ace Inhibitors Cough     Changed to ARB.        All Meds and Allergies reviewed in the record at the facility and is the most up-to-date.    Post Discharge Medication Reconciliation Status: discharge medications reconciled and changed, per note/orders  Current Outpatient Medications   Medication Sig     acetaminophen (TYLENOL) 500 MG tablet Take 1,000 mg by mouth every 6  hours as needed for mild pain     albuterol (PROAIR HFA/PROVENTIL HFA/VENTOLIN HFA) 108 (90 Base) MCG/ACT inhaler Inhale 2 puffs into the lungs every 6 hours as needed for shortness of breath / dyspnea or wheezing     amLODIPine (NORVASC) 10 MG tablet Take 10 mg by mouth daily     Atorvastatin Calcium (LIPITOR PO) Take 80 mg by mouth daily     cetirizine (ZYRTEC) 10 MG tablet Take 10 mg by mouth daily     famotidine (PEPCID) 20 MG tablet Take 20 mg by mouth 2 times daily     folic acid (FOLVITE) 1 MG tablet Take 1 mg by mouth daily     insulin aspart (NOVOLOG PEN) 100 UNIT/ML pen Inject 16 Units Subcutaneous 3 times daily (with meals) Give 16 units at breakfast in the afternoon.  12 units prelunch.     insulin aspart (NOVOLOG VIAL) 100 UNITS/ML vial Inject Subcutaneous 3 times daily (with meals) 16 UNITS BREAKFAST  12 UNITS LUNCH  16 UNITS DINNER  And give TID Per Sliding Scale;  If Blood Sugar is 150 to 199, give 3 Units.  If Blood Sugar is 200 to 299, give 6 Units.  If Blood Sugar is 300 to 399, give 9 Units.  Special Instructions: blood glucose dose < 70. see hypoglycemia protocol  no insulin give prandial insulin if ordered     insulin glargine (LANTUS) SOLN 100 UNIT/ML Inject 45 Units Subcutaneous At Bedtime     losartan (COZAAR) 50 MG tablet Take 50 mg by mouth daily     Metoprolol Succinate (TOPROL XL PO) Take 50 mg by mouth daily     multivitamin w/minerals (THERA-VIT-M) tablet Take 1 tablet by mouth daily     oxyCODONE (ROXICODONE) 5 MG tablet Take 5-10 mg by mouth every 4 hours as needed for severe pain     Sertraline HCl (ZOLOFT PO) Take 150 mg by mouth daily     warfarin ANTICOAGULANT (COUMADIN) 7.5 MG tablet Take 7.5 mg by mouth daily Patient takes 7.5 mg on Thursday, Saturday, Sunday and 5 mg on Monday, Wednesday.  Follow-up INR on 8/9/2021.     No current facility-administered medications for this visit.       REVIEW OF SYSTEMS:   Review of Systems  No fevers or chills. No headache,  "lightheadedness or dizziness. No SOB, chest pains or palpitations. Appetite is good. No nausea, vomiting. + constipation.  No dysuria, frequency, burning or pain with urination.  Pain control with Tylenol.  Lower extremity edema.  Otherwise review of systems are negative.     PHYSICAL EXAMINATION:  Physical Exam     Vital signs: BP (!) 142/73   Pulse 87   Temp 97  F (36.1  C)   Resp 18   Ht 1.854 m (6' 1\")   Wt (!) 150.8 kg (332 lb 6.4 oz)   SpO2 95%   BMI 43.85 kg/m    General: Awake, Alert, oriented x3, appropriately, follows simple commands, conversant  HEENT: Pink conjunctiva, anicteric sclerae, moist oral mucosa  NECK: Supple, without any lymphadenopathy, or masses  CVS:  S1  S2, without murmur or gallop.   LUNG: Clear to auscultation, No wheezes, rales or rhonci.  BACK: No kyphosis of the thoracic spine  ABDOMEN: Soft, obese, nontender to palpation, with positive bowel sounds  EXTREMITIES: Moves both upper and lower extremities with some limitation, 2+ pedal edema to surgical side, no calf tenderness.  Prosthesis to left lower extremity with history of BKA.  SKIN: Incision to right knee with surgical dressing with strikethrough at the distal border.  Slightly red.  Slightly warm.  NEUROLOGIC: Intact, pulses palpable  PSYCHIATRIC: Cognition intact      Labs:  All labs reviewed in the nursing home record and Epic     Assessment/plan:    ICD-10-CM    1. Primary osteoarthritis of right knee  M17.11  Continue Tylenol for pain.   2. S/P total knee arthroplasty, right  Z96.651  Bandage with strikethrough.  Slightly red.  Slightly warm.  Update Ortho.  Follow-up CBC in the a.m.  Encourage elevation.  Apply Tubigrip on in the a.m. off at at bedtime from toes to above the knee.   3. Hx of BKA, left (H)  Z89.512  Continues in prosthesis.   4. Type 2 diabetes mellitus with hyperglycemia, with long-term current use of insulin (H)  E11.65  Continues on insulin pump.  He is receiving 12 units of aspart prelunch.  16 " at breakfast and in the afternoon.  Continues on Lantus 45 units nightly.  Resume his home sliding scale.     Z79.4    5. Paroxysmal atrial fibrillation (H)  I48.0    6. Essential hypertension  I10    7. Chronic kidney disease, unspecified CKD stage  N18.9      Okay for PT OT eval and treat.  Follow-up CBC and BMP in the a.m.    45 minutes spent of which greater than 50% was face to face communication with the patient about pain management, diabetes management as well as collaborating with nursing staff and therapy.    This note has been dictated using voice recognition software. Any grammatical or context distortions are unintentional and inherent to the software    Electronically signed by: Tram Guy, CNP

## 2022-05-10 ENCOUNTER — ANTICOAGULATION THERAPY VISIT (OUTPATIENT)
Dept: ANTICOAGULATION | Facility: CLINIC | Age: 72
End: 2022-05-10
Payer: MEDICARE

## 2022-05-10 DIAGNOSIS — I48.91 A-FIB (H): Primary | ICD-10-CM

## 2022-05-10 LAB
ANION GAP SERPL CALCULATED.3IONS-SCNC: 10 MMOL/L (ref 5–18)
BUN SERPL-MCNC: 14 MG/DL (ref 8–28)
CALCIUM SERPL-MCNC: 9.7 MG/DL (ref 8.5–10.5)
CHLORIDE BLD-SCNC: 101 MMOL/L (ref 98–107)
CO2 SERPL-SCNC: 26 MMOL/L (ref 22–31)
CREAT SERPL-MCNC: 1.04 MG/DL (ref 0.7–1.3)
GFR SERPL CREATININE-BSD FRML MDRD: 76 ML/MIN/1.73M2
GLUCOSE BLD-MCNC: 263 MG/DL (ref 70–125)
INR (EXTERNAL): 2.2 (ref 0.9–1.1)
POTASSIUM BLD-SCNC: 4.5 MMOL/L (ref 3.5–5)
SODIUM SERPL-SCNC: 137 MMOL/L (ref 136–145)

## 2022-05-10 PROCEDURE — P9604 ONE-WAY ALLOW PRORATED TRIP: HCPCS | Performed by: FAMILY MEDICINE

## 2022-05-10 PROCEDURE — 36415 COLL VENOUS BLD VENIPUNCTURE: CPT | Performed by: FAMILY MEDICINE

## 2022-05-10 PROCEDURE — 80048 BASIC METABOLIC PNL TOTAL CA: CPT | Performed by: FAMILY MEDICINE

## 2022-05-10 NOTE — PROGRESS NOTES
Fax from [facility]. INR 2.2 on 5/10/2022. Patient got 15 mg Friday, 7.5 mg other days. All questions are no for the answer.    Augustina Bryan RN    [facility]ticoagulation [facility]

## 2022-05-10 NOTE — PROGRESS NOTES
ANTICOAGULATION MANAGEMENT     Oliver Laswon 71 year old male is on warfarin with therapeutic INR result. (Goal INR 2.0-3.0)    Recent labs: (last 7 days)     05/10/22  0000   INR 2.2*       ASSESSMENT       Source(s): Chart Review and Home Care/Facility Nurse       Warfarin doses taken: Warfarin taken as instructed    Diet: No new diet changes identified    New illness, injury, or hospitalization: No, continues to recover from knee replacement    Medication/supplement changes: None noted    Signs or symptoms of bleeding or clotting: No    Previous INR: Subtherapeutic    Additional findings: None       PLAN     Recommended plan for no diet, medication or health factor changes affecting INR     Dosing Instructions: continue your current warfarin dose with next INR in 1 week       Summary  As of 5/10/2022    Full warfarin instructions:  7.5 mg every day   Next INR check:  5/17/2022             Telephone call with [facility] home care/facility nurse who agrees to plan and repeated back plan correctly    Orders given to  Homecare nurse/facility to recheck    Education provided: Please call back if any changes to your diet, medications or how you've been taking warfarin    Plan made per [facility] anticoagulation protocol    Farhana Arciniega, RN  Anticoagulation Clinic  5/10/2022    _______________________________________________________________________     Anticoagulation Episode Summary     Current INR goal:  2.0-3.0   TTR:  --   Target end date:  Indefinite   Send INR reminders to:  [facility] (TCU/LTC/[facility]O)    Indications    A-fib (H) [I48.91]           Comments:  Jasvir Frye [phone]         Anticoagulation Care Providers     Provider Role Specialty Phone number    Kamran Stern DO Referring Family Medicine [phone]

## 2022-05-12 ENCOUNTER — TRANSITIONAL CARE UNIT VISIT (OUTPATIENT)
Dept: GERIATRICS | Facility: CLINIC | Age: 72
End: 2022-05-12
Payer: MEDICARE

## 2022-05-12 VITALS
TEMPERATURE: 96.3 F | RESPIRATION RATE: 18 BRPM | DIASTOLIC BLOOD PRESSURE: 73 MMHG | SYSTOLIC BLOOD PRESSURE: 136 MMHG | HEIGHT: 73 IN | WEIGHT: 315 LBS | OXYGEN SATURATION: 97 % | BODY MASS INDEX: 41.75 KG/M2 | HEART RATE: 84 BPM

## 2022-05-12 DIAGNOSIS — I10 ESSENTIAL HYPERTENSION: ICD-10-CM

## 2022-05-12 DIAGNOSIS — E11.65 TYPE 2 DIABETES MELLITUS WITH HYPERGLYCEMIA, WITH LONG-TERM CURRENT USE OF INSULIN (H): ICD-10-CM

## 2022-05-12 DIAGNOSIS — Z79.4 TYPE 2 DIABETES MELLITUS WITH HYPERGLYCEMIA, WITH LONG-TERM CURRENT USE OF INSULIN (H): ICD-10-CM

## 2022-05-12 DIAGNOSIS — Z89.512 HX OF BKA, LEFT (H): ICD-10-CM

## 2022-05-12 DIAGNOSIS — M17.11 PRIMARY OSTEOARTHRITIS OF RIGHT KNEE: Primary | ICD-10-CM

## 2022-05-12 DIAGNOSIS — Z96.651 S/P TOTAL KNEE ARTHROPLASTY, RIGHT: ICD-10-CM

## 2022-05-12 DIAGNOSIS — I48.0 PAROXYSMAL ATRIAL FIBRILLATION (H): ICD-10-CM

## 2022-05-12 PROCEDURE — 99309 SBSQ NF CARE MODERATE MDM 30: CPT | Performed by: NURSE PRACTITIONER

## 2022-05-13 NOTE — PROGRESS NOTES
[facility] GERIATRIC SERVICES    Code Status:  FULL CODE   Visit Type:   Chief Complaint   Patient presents with     Nursing Home Acute     TCU Follow up     Facility:  [facility] ([facility]) [[id number]]           Transitional Care Course: Oliver Lawson is a 71 year old male who I am seeing today for follow-up on the TCU.  Patient recently hospitalized on 5/3/2022 at [facility] secondary to osteoarthritis of the right knee who underwent right TKA.  Past medical history includes diabetes mellitus type 2 with severe sensory neuropathy of the feet, hyperlipidemia, sleep apnea on CPAP, rheumatoid arthritis, hypertension, GERD, COPD, AV block third-degree status post pacemaker, proximal atrial fib, BPH, morbid obesity and below the knee amputation.  Preoperative x-rays revealed arthritic changes in the right knee.  Patient status post right TKA.  He tolerated the procedure well.  Postoperatively he did have some pain management issues.  Now tolerating only Tylenol.  He reports today he did not tolerate oxycodone very well.  Hemoglobin stable at 12.4 postoperatively.  History of atrial fib.  Continues on aspirin and Coumadin.  INR is managed per the Coumadin clinic.  Diabetes mellitus type 2.  He does have an insulin pump.  Sleep apnea on CPAP.  Hypertension satisfactory.    On today's visit patient is lying in bed.  Patient status post right TKA.  Patient with previous redness and warmth surrounding surgical bandage.  Surgical bandage removed and per nursing patient had a large hematoma that ruptured.  Today incision intact with glue.  No redness or warmth.  Minimal serosanguineous drainage to the distal aspect.  Patient continues with moderate edema and surgical site.  He is in a Tubigrip stocking.  He continues on Tylenol and oxycodone for pain.  History of right BKA with prosthesis.  Patient continues on aspirin and Coumadin.  Underlying diabetes mellitus type 2.  He continues on Lantus and short acting  insulin.  I did resume his previous home dose on last visit.  Blood sugars improving.  Today he is asking to have the greater dose at [facility].  At home his biggest meal is his breakfast meal.  Patient using prune juice earlier in the week.  He has had a large bowel movement.  Hypertension.  Blood pressure satisfactory.      Active Ambulatory Problems     Diagnosis Date Noted     Traumatic hematoma 07/15/2021     ACP (advance care planning) 11/07/2005     Type 2 diabetes mellitus with hyperglycemia (H) 07/19/2021     Renal insufficiency syndrome 01/10/2011     COPD (chronic obstructive pulmonary disease) (H) 12/29/2014     Obesity, morbid (more than 100 lbs over ideal weight or BMI > 40) (H) 12/17/2010     Paroxysmal atrial fibrillation (H) 05/03/2016     Abscess of plantar aspect of foot 08/03/2021     Alcohol dependence (H) 12/13/2010     AV block, 3rd degree (H) 02/07/2016     Bilateral hearing loss 04/25/2016     BPH (benign prostatic hyperplasia) 07/21/2017     Charcot foot due to diabetes mellitus (H) 08/03/2021     Diabetes mellitus without complication (H) 02/07/2005     Fall 07/12/2021     Fall with injury 07/13/2021     GERD (gastroesophageal reflux disease) 03/12/2013     Hypertension 12/13/2010     Low vision, both eyes 02/17/2016     Dysthymic disorder 08/03/2021     Hyperlipidemia 02/07/2005     TGA (transient global amnesia) 11/16/2014     Surgical aftercare, musculoskeletal system 02/28/2019     Superficial bruising 07/12/2021     Spiral fracture of shaft of femur (H) 12/06/2018     Sleep apnea 08/03/2021     S/P placement of cardiac pacemaker 08/29/2016     S/P below knee amputation, left (H) 07/03/2018     Rheumatoid arthritis (H) 01/04/2007     Pre-ulcerative calluses 04/14/2014     Other diseases of lung, not elsewhere classified 11/14/2005     Osteomyelitis of right foot (H) 08/03/2021     Nonspecific abnormal electrocardiogram (ECG) (EKG) 08/03/2021     MRSA (methicillin resistant Staphylococcus  aureus) infection 11/23/2012     Lower limb amputation, great toe (H) 03/02/2011     Morbid obesity with BMI of 40.0-44.9, adult (H) 12/07/2018     A-fib (H) 05/06/2022     Umbilical hernia 01/03/2022     Osteoarthritis of right knee 05/03/2022     Resolved Ambulatory Problems     Diagnosis Date Noted     No Resolved Ambulatory Problems     Past Medical History:   Diagnosis Date     Dyspnea on exertion      Gastro-oesophageal reflux disease      Methicillin resistant Staphylococcus aureus in conditions classified elsewhere and of unspecified site      Morbid obesity (H)      Numbness and tingling      Open wound of foot except toe(s) alone, complicated      Other and unspecified alcohol dependence, unspecified drinking behavior      Other and unspecified hyperlipidemia      Renal insufficiency      Rheumatoid arthritis (H)      Shortness of breath      Type II or unspecified type diabetes mellitus without mention of complication, not stated as uncontrolled      Unspecified pleural effusion        Current Outpatient Medications:      acetaminophen (TYLENOL) 500 MG tablet, Take 1,000 mg by mouth every 6 hours as needed for mild pain, Disp: , Rfl:      albuterol (PROAIR HFA/PROVENTIL HFA/VENTOLIN HFA) 108 (90 Base) MCG/ACT inhaler, Inhale 2 puffs into the lungs every 6 hours as needed for shortness of breath / dyspnea or wheezing, Disp: , Rfl:      amLODIPine (NORVASC) 10 MG tablet, Take 10 mg by mouth daily, Disp: , Rfl:      Atorvastatin Calcium (LIPITOR PO), Take 80 mg by mouth daily, Disp: , Rfl:      cetirizine (ZYRTEC) 10 MG tablet, Take 10 mg by mouth daily, Disp: , Rfl:      famotidine (PEPCID) 20 MG tablet, Take 20 mg by mouth 2 times daily, Disp: , Rfl:      folic acid (FOLVITE) 1 MG tablet, Take 1 mg by mouth daily, Disp: , Rfl:      insulin aspart (NOVOLOG PEN) 100 UNIT/ML pen, Inject 16 Units Subcutaneous 3 times daily (with meals) Give 16 units at breakfast in the afternoon.  12 units prelunch., Disp: ,  Rfl:      insulin aspart (NOVOLOG VIAL) 100 UNITS/ML vial, Inject Subcutaneous 3 times daily (with meals) 16 UNITS BREAKFAST 12 UNITS LUNCH 16 UNITS DINNER And give TID Per Sliding Scale; If Blood Sugar is 150 to 199, give 3 Units. If Blood Sugar is 200 to 299, give 6 Units. If Blood Sugar is 300 to 399, give 9 Units. Special Instructions: blood glucose dose < 70. see hypoglycemia protocol  no insulin give prandial insulin if ordered, Disp: , Rfl:      insulin glargine (LANTUS) SOLN 100 UNIT/ML, Inject 45 Units Subcutaneous At Bedtime, Disp: , Rfl:      losartan (COZAAR) 50 MG tablet, Take 50 mg by mouth daily, Disp: , Rfl:      Metoprolol Succinate (TOPROL XL PO), Take 50 mg by mouth daily, Disp: , Rfl:      multivitamin w/minerals (THERA-VIT-M) tablet, Take 1 tablet by mouth daily, Disp: , Rfl:      oxyCODONE (ROXICODONE) 5 MG tablet, Take 5-10 mg by mouth every 4 hours as needed for severe pain, Disp: , Rfl:      Sertraline HCl (ZOLOFT PO), Take 150 mg by mouth daily, Disp: , Rfl:      warfarin ANTICOAGULANT (COUMADIN) 7.5 MG tablet, Take 7.5 mg by mouth daily Patient takes 7.5 mg on Thursday, Saturday, Sunday and 5 mg on Monday, Wednesday.  Follow-up INR on 8/9/2021., Disp: , Rfl:   Allergies   Allergen Reactions     Angiotensin Receptor Blockers      ARB-Angiotensin Receptor Antagonist     Contrast Dye      Diatrizoate Other (See Comments)     Patient recommended by a past doctor to not use Contrast Dye due to body chemistry    -- seriously? Someone check into this     Ace Inhibitors Cough     Changed to ARB.        All Meds and Allergies reviewed in the record at the facility and is the most up-to-date.    Post Discharge Medication Reconciliation Status: discharge medications reconciled and changed, per note/orders  Current Outpatient Medications   Medication Sig     acetaminophen (TYLENOL) 500 MG tablet Take 1,000 mg by mouth every 6 hours as needed for mild pain     albuterol (PROAIR HFA/PROVENTIL  HFA/VENTOLIN HFA) 108 (90 Base) MCG/ACT inhaler Inhale 2 puffs into the lungs every 6 hours as needed for shortness of breath / dyspnea or wheezing     amLODIPine (NORVASC) 10 MG tablet Take 10 mg by mouth daily     Atorvastatin Calcium (LIPITOR PO) Take 80 mg by mouth daily     cetirizine (ZYRTEC) 10 MG tablet Take 10 mg by mouth daily     famotidine (PEPCID) 20 MG tablet Take 20 mg by mouth 2 times daily     folic acid (FOLVITE) 1 MG tablet Take 1 mg by mouth daily     insulin aspart (NOVOLOG PEN) 100 UNIT/ML pen Inject 16 Units Subcutaneous 3 times daily (with meals) Give 16 units at breakfast in the afternoon.  12 units prelunch.     insulin aspart (NOVOLOG VIAL) 100 UNITS/ML vial Inject Subcutaneous 3 times daily (with meals) 16 UNITS BREAKFAST  12 UNITS LUNCH  16 UNITS DINNER  And give TID Per Sliding Scale;  If Blood Sugar is 150 to 199, give 3 Units.  If Blood Sugar is 200 to 299, give 6 Units.  If Blood Sugar is 300 to 399, give 9 Units.  Special Instructions: blood glucose dose < 70. see hypoglycemia protocol  no insulin give prandial insulin if ordered     insulin glargine (LANTUS) SOLN 100 UNIT/ML Inject 45 Units Subcutaneous At Bedtime     losartan (COZAAR) 50 MG tablet Take 50 mg by mouth daily     Metoprolol Succinate (TOPROL XL PO) Take 50 mg by mouth daily     multivitamin w/minerals (THERA-VIT-M) tablet Take 1 tablet by mouth daily     oxyCODONE (ROXICODONE) 5 MG tablet Take 5-10 mg by mouth every 4 hours as needed for severe pain     Sertraline HCl (ZOLOFT PO) Take 150 mg by mouth daily     warfarin ANTICOAGULANT (COUMADIN) 7.5 MG tablet Take 7.5 mg by mouth daily Patient takes 7.5 mg on Thursday, Saturday, Sunday and 5 mg on Monday, Wednesday.  Follow-up INR on 8/9/2021.     No current facility-administered medications for this visit.       REVIEW OF SYSTEMS:   Review of Systems  No fevers or chills. No headache, lightheadedness or dizziness. No SOB, chest pains or palpitations. Appetite is  "good. No nausea, vomiting. Constipation improved with prune juice.  No dysuria, frequency, burning or pain with urination.  Pain control with Tylenol.  Lower extremity edema.  Otherwise review of systems are negative.     PHYSICAL EXAMINATION:  Physical Exam     Vital signs: /73   Pulse 84   Temp (!) 96.3  F (35.7  C)   Resp 18   Ht 1.854 m (6' 1\")   Wt (!) 152.9 kg (337 lb)   SpO2 97%   BMI 44.46 kg/m    General: Awake, Alert, oriented x3, appropriately, follows simple commands, conversant  HEENT: Pink conjunctiva, anicteric sclerae, moist oral mucosa  NECK: Supple, without any lymphadenopathy, or masses  CVS:  S1  S2, without murmur or gallop.   LUNG: Clear to auscultation, No wheezes, rales or rhonci.  BACK: No kyphosis of the thoracic spine  ABDOMEN: Soft, obese, nontender to palpation, with positive bowel sounds  EXTREMITIES: Moves both upper and lower extremities with some limitation, 2+ pedal edema to surgical side, no calf tenderness.  Tubigrip on.  Prosthesis to left lower extremity with history of BKA.  SKIN: Incision to right knee intact with glue, small amount of serosanguineous drainage to distal incision.  No redness or warmth.  NEUROLOGIC: Intact, pulses palpable  PSYCHIATRIC: Cognition intact      Labs:  All labs reviewed in the nursing home record and Epic     Assessment/plan:    ICD-10-CM    1. Primary osteoarthritis of right knee  M17.11  Continue Tylenol for pain.   2. S/P total knee arthroplasty, right  Z96.651  Continue with dry sterile dressing.  Tubigrip for compression.  Ice between therapies.  Encourage elevation.   3. Hx of BKA, left (H)  Z89.512  Continues in prosthesis.   4. Type 2 diabetes mellitus with hyperglycemia, with long-term current use of insulin (H)  E11.65  Continues on insulin pump.  Aspart 16 at brunch and supper.  12 units at breakfast.  And in the afternoon.  Continues on Lantus 45 units nightly.   Also continue sliding scale meals.    Z79.4    5. Paroxysmal " atrial fibrillation (H)  I48.0  Continues on chronic anticoagulation.  INRs managed per the Coumadin clinic.   6. Essential hypertension  I10  satisfactory control.       This note has been dictated using voice recognition software. Any grammatical or context distortions are unintentional and inherent to the software    Electronically signed by: Tram Guy CNP

## 2022-05-14 ENCOUNTER — LAB REQUISITION (OUTPATIENT)
Dept: LAB | Facility: CLINIC | Age: 72
End: 2022-05-14
Payer: MEDICARE

## 2022-05-16 LAB — HGB BLD-MCNC: 12 G/DL (ref 13.3–17.7)

## 2022-05-16 PROCEDURE — 36415 COLL VENOUS BLD VENIPUNCTURE: CPT | Performed by: FAMILY MEDICINE

## 2022-05-16 PROCEDURE — 85018 HEMOGLOBIN: CPT | Performed by: FAMILY MEDICINE

## 2022-05-16 PROCEDURE — P9604 ONE-WAY ALLOW PRORATED TRIP: HCPCS | Performed by: FAMILY MEDICINE

## 2022-05-17 ENCOUNTER — TRANSITIONAL CARE UNIT VISIT (OUTPATIENT)
Dept: GERIATRICS | Facility: CLINIC | Age: 72
End: 2022-05-17
Payer: MEDICARE

## 2022-05-17 ENCOUNTER — ANTICOAGULATION THERAPY VISIT (OUTPATIENT)
Dept: ANTICOAGULATION | Facility: CLINIC | Age: 72
End: 2022-05-17
Payer: MEDICARE

## 2022-05-17 VITALS
HEART RATE: 88 BPM | DIASTOLIC BLOOD PRESSURE: 71 MMHG | WEIGHT: 315 LBS | RESPIRATION RATE: 16 BRPM | TEMPERATURE: 97.7 F | BODY MASS INDEX: 44.1 KG/M2 | HEIGHT: 71 IN | SYSTOLIC BLOOD PRESSURE: 147 MMHG | OXYGEN SATURATION: 97 %

## 2022-05-17 DIAGNOSIS — Z79.4 TYPE 2 DIABETES MELLITUS WITH HYPERGLYCEMIA, WITH LONG-TERM CURRENT USE OF INSULIN (H): ICD-10-CM

## 2022-05-17 DIAGNOSIS — I48.91 A-FIB (H): Primary | ICD-10-CM

## 2022-05-17 DIAGNOSIS — Z89.512 HX OF BKA, LEFT (H): ICD-10-CM

## 2022-05-17 DIAGNOSIS — M17.11 PRIMARY OSTEOARTHRITIS OF RIGHT KNEE: Primary | ICD-10-CM

## 2022-05-17 DIAGNOSIS — I10 ESSENTIAL HYPERTENSION: ICD-10-CM

## 2022-05-17 DIAGNOSIS — Z96.651 S/P TOTAL KNEE ARTHROPLASTY, RIGHT: ICD-10-CM

## 2022-05-17 DIAGNOSIS — E11.65 TYPE 2 DIABETES MELLITUS WITH HYPERGLYCEMIA, WITH LONG-TERM CURRENT USE OF INSULIN (H): ICD-10-CM

## 2022-05-17 DIAGNOSIS — I48.0 PAROXYSMAL ATRIAL FIBRILLATION (H): ICD-10-CM

## 2022-05-17 LAB — INR (EXTERNAL): 2.7 (ref 0.9–1.1)

## 2022-05-17 PROCEDURE — 99309 SBSQ NF CARE MODERATE MDM 30: CPT | Performed by: NURSE PRACTITIONER

## 2022-05-17 NOTE — PROGRESS NOTES
ANTICOAGULATION MANAGEMENT     Oliver Lawson 71 year old male is on warfarin with therapeutic INR result. (Goal INR 2.0-3.0)    Recent labs: (last 7 days)     05/17/22  0000   INR 2.7*       ASSESSMENT       Source(s): Chart Review and Home Care/Facility Nurse       Warfarin doses taken: Warfarin taken as instructed    Diet: No new diet changes identified    New illness, injury, or hospitalization: No    Medication/supplement changes: None noted    Signs or symptoms of bleeding or clotting: No    Previous INR: Therapeutic last 2(+) visits    Additional findings: None       PLAN     Recommended plan for no diet, medication or health factor changes affecting INR     Dosing Instructions: continue your current warfarin dose with next INR in 10 days       Summary  As of 5/17/2022    Full warfarin instructions:  7.5 mg every day   Next INR check:  5/24/2022             Telephone call with [facility] home care/facility nurse who verbalizes understanding and agrees to plan    Orders given to  Homecare nurse/facility to recheck    Education provided: Goal range and significance of current result    Plan made per [facility] anticoagulation protocol    Channing Smith RN  Anticoagulation Clinic  5/17/2022    _______________________________________________________________________     Anticoagulation Episode Summary     Current INR goal:  2.0-3.0   TTR:  100.0 % (1 d)   Target end date:  Indefinite   Send INR reminders to:  [facility] (TCU/LTC/[facility]O)    Indications    A-fib (H) [I48.91]           Comments:  Jasvir Frye [phone]         Anticoagulation Care Providers     Provider Role Specialty Phone number    Kamran Stern DO Referring Family Medicine [phone]

## 2022-05-18 NOTE — PROGRESS NOTES
[facility]    Code Status:  FULL CODE   Visit Type:   Chief Complaint   Patient presents with     Nursing Home Acute     TCU Follow up     Facility:  [facility] () [[id number]]           Transitional Care Course: Oliver Lawson is a 71 year old male who I am seeing today for follow-up on the TCU.  Patient recently hospitalized on 5/3/2022 at [facility] secondary to osteoarthritis of the right knee who underwent right TKA.  Past medical history includes diabetes mellitus type 2 with severe sensory neuropathy of the feet, hyperlipidemia, sleep apnea on CPAP, rheumatoid arthritis, hypertension, GERD, COPD, AV block third-degree status post pacemaker, proximal atrial fib, BPH, morbid obesity and below the knee amputation.  Preoperative x-rays revealed arthritic changes in the right knee.  Patient status post right TKA.  He tolerated the procedure well.  Postoperatively he did have some pain management issues.  Now tolerating only Tylenol.  He reports today he did not tolerate oxycodone very well.  Hemoglobin stable at 12.4 postoperatively.  History of atrial fib.  Continues on aspirin and Coumadin.  INR is managed per the Coumadin clinic.  Diabetes mellitus type 2.  He does have an insulin pump.  Sleep apnea on CPAP.  Hypertension satisfactory.    On today's visit patient sitting up in chair.  Patient status post right TKA.  Bandage in place without strikethrough.  No further redness or warmth.  Pain well controlled with oxycodone and Tylenol.  He continues in Tubigrip.  History of BKA to the right with prosthetic in place.  History of atrophia.  Patient continues on aspirin and Coumadin.  He continues on metoprolol for rate control.  Underlying diabetes mellitus type 2.  He continues on Lantus and short acting insulin with sliding scale.  Blood sugars still occasionally in the 200s however today he reports he is not always compliant with diabetic diet.  He does not want to increase  his insulin at this time. Hypertension.  Blood pressure satisfactory.      Active Ambulatory Problems     Diagnosis Date Noted     Traumatic hematoma 07/15/2021     ACP (advance care planning) 11/07/2005     Type 2 diabetes mellitus with hyperglycemia (H) 07/19/2021     Renal insufficiency syndrome 01/10/2011     COPD (chronic obstructive pulmonary disease) (H) 12/29/2014     Obesity, morbid (more than 100 lbs over ideal weight or BMI > 40) (H) 12/17/2010     Paroxysmal atrial fibrillation (H) 05/03/2016     Abscess of plantar aspect of foot 08/03/2021     Alcohol dependence (H) 12/13/2010     AV block, 3rd degree (H) 02/07/2016     Bilateral hearing loss 04/25/2016     BPH (benign prostatic hyperplasia) 07/21/2017     Charcot foot due to diabetes mellitus (H) 08/03/2021     Diabetes mellitus without complication (H) 02/07/2005     Fall 07/12/2021     Fall with injury 07/13/2021     GERD (gastroesophageal reflux disease) 03/12/2013     Hypertension 12/13/2010     Low vision, both eyes 02/17/2016     Dysthymic disorder 08/03/2021     Hyperlipidemia 02/07/2005     TGA (transient global amnesia) 11/16/2014     Surgical aftercare, musculoskeletal system 02/28/2019     Superficial bruising 07/12/2021     Spiral fracture of shaft of femur (H) 12/06/2018     Sleep apnea 08/03/2021     S/P placement of cardiac pacemaker 08/29/2016     S/P below knee amputation, left (H) 07/03/2018     Rheumatoid arthritis (H) 01/04/2007     Pre-ulcerative calluses 04/14/2014     Other diseases of lung, not elsewhere classified 11/14/2005     Osteomyelitis of right foot (H) 08/03/2021     Nonspecific abnormal electrocardiogram (ECG) (EKG) 08/03/2021     MRSA (methicillin resistant Staphylococcus aureus) infection 11/23/2012     Lower limb amputation, great toe (H) 03/02/2011     Morbid obesity with BMI of 40.0-44.9, adult (H) 12/07/2018     A-fib (H) 05/06/2022     Umbilical hernia 01/03/2022     Osteoarthritis of right knee 05/03/2022      Resolved Ambulatory Problems     Diagnosis Date Noted     No Resolved Ambulatory Problems     Past Medical History:   Diagnosis Date     Dyspnea on exertion      Gastro-oesophageal reflux disease      Methicillin resistant Staphylococcus aureus in conditions classified elsewhere and of unspecified site      Morbid obesity (H)      Numbness and tingling      Open wound of foot except toe(s) alone, complicated      Other and unspecified alcohol dependence, unspecified drinking behavior      Other and unspecified hyperlipidemia      Renal insufficiency      Rheumatoid arthritis (H)      Shortness of breath      Type II or unspecified type diabetes mellitus without mention of complication, not stated as uncontrolled      Unspecified pleural effusion        Current Outpatient Medications:      acetaminophen (TYLENOL) 500 MG tablet, Take 1,000 mg by mouth every 6 hours as needed for mild pain, Disp: , Rfl:      albuterol (PROAIR HFA/PROVENTIL HFA/VENTOLIN HFA) 108 (90 Base) MCG/ACT inhaler, Inhale 2 puffs into the lungs every 6 hours as needed for shortness of breath / dyspnea or wheezing, Disp: , Rfl:      amLODIPine (NORVASC) 10 MG tablet, Take 10 mg by mouth daily, Disp: , Rfl:      Atorvastatin Calcium (LIPITOR PO), Take 80 mg by mouth daily, Disp: , Rfl:      cetirizine (ZYRTEC) 10 MG tablet, Take 10 mg by mouth daily, Disp: , Rfl:      famotidine (PEPCID) 20 MG tablet, Take 20 mg by mouth 2 times daily, Disp: , Rfl:      folic acid (FOLVITE) 1 MG tablet, Take 1 mg by mouth daily, Disp: , Rfl:      insulin aspart (NOVOLOG PEN) 100 UNIT/ML pen, Inject 16 Units Subcutaneous 3 times daily (with meals) Give 16 units at breakfast in the afternoon.  12 units prelunch., Disp: , Rfl:      insulin aspart (NOVOLOG VIAL) 100 UNITS/ML vial, Inject Subcutaneous 3 times daily (with meals) 16 UNITS BREAKFAST 12 UNITS LUNCH 16 UNITS DINNER And give TID Per Sliding Scale; If Blood Sugar is 150 to 199, give 3 Units. If Blood Sugar  is 200 to 299, give 6 Units. If Blood Sugar is 300 to 399, give 9 Units. Special Instructions: blood glucose dose < 70. see hypoglycemia protocol  no insulin give prandial insulin if ordered, Disp: , Rfl:      insulin glargine (LANTUS) SOLN 100 UNIT/ML, Inject 45 Units Subcutaneous At Bedtime, Disp: , Rfl:      losartan (COZAAR) 50 MG tablet, Take 50 mg by mouth daily, Disp: , Rfl:      Metoprolol Succinate (TOPROL XL PO), Take 50 mg by mouth daily, Disp: , Rfl:      multivitamin w/minerals (THERA-VIT-M) tablet, Take 1 tablet by mouth daily, Disp: , Rfl:      oxyCODONE (ROXICODONE) 5 MG tablet, Take 5-10 mg by mouth every 4 hours as needed for severe pain, Disp: , Rfl:      Sertraline HCl (ZOLOFT PO), Take 150 mg by mouth daily, Disp: , Rfl:      warfarin ANTICOAGULANT (COUMADIN) 7.5 MG tablet, Take 7.5 mg by mouth daily Patient takes 7.5 mg on Thursday, Saturday, Sunday and 5 mg on Monday, Wednesday.  Follow-up INR on 8/9/2021., Disp: , Rfl:   Allergies   Allergen Reactions     Angiotensin Receptor Blockers      ARB-Angiotensin Receptor Antagonist     Contrast Dye      Diatrizoate Other (See Comments)     Patient recommended by a past doctor to not use Contrast Dye due to body chemistry    -- seriously? Someone check into this     Ace Inhibitors Cough     Changed to ARB.        All Meds and Allergies reviewed in the record at the facility and is the most up-to-date.    Post Discharge Medication Reconciliation Status: discharge medications reconciled and changed, per note/orders  Current Outpatient Medications   Medication Sig     acetaminophen (TYLENOL) 500 MG tablet Take 1,000 mg by mouth every 6 hours as needed for mild pain     albuterol (PROAIR HFA/PROVENTIL HFA/VENTOLIN HFA) 108 (90 Base) MCG/ACT inhaler Inhale 2 puffs into the lungs every 6 hours as needed for shortness of breath / dyspnea or wheezing     amLODIPine (NORVASC) 10 MG tablet Take 10 mg by mouth daily     Atorvastatin Calcium (LIPITOR PO)  Take 80 mg by mouth daily     cetirizine (ZYRTEC) 10 MG tablet Take 10 mg by mouth daily     famotidine (PEPCID) 20 MG tablet Take 20 mg by mouth 2 times daily     folic acid (FOLVITE) 1 MG tablet Take 1 mg by mouth daily     insulin aspart (NOVOLOG PEN) 100 UNIT/ML pen Inject 16 Units Subcutaneous 3 times daily (with meals) Give 16 units at breakfast in the afternoon.  12 units prelunch.     insulin aspart (NOVOLOG VIAL) 100 UNITS/ML vial Inject Subcutaneous 3 times daily (with meals) 16 UNITS BREAKFAST  12 UNITS LUNCH  16 UNITS DINNER  And give TID Per Sliding Scale;  If Blood Sugar is 150 to 199, give 3 Units.  If Blood Sugar is 200 to 299, give 6 Units.  If Blood Sugar is 300 to 399, give 9 Units.  Special Instructions: blood glucose dose < 70. see hypoglycemia protocol  no insulin give prandial insulin if ordered     insulin glargine (LANTUS) SOLN 100 UNIT/ML Inject 45 Units Subcutaneous At Bedtime     losartan (COZAAR) 50 MG tablet Take 50 mg by mouth daily     Metoprolol Succinate (TOPROL XL PO) Take 50 mg by mouth daily     multivitamin w/minerals (THERA-VIT-M) tablet Take 1 tablet by mouth daily     oxyCODONE (ROXICODONE) 5 MG tablet Take 5-10 mg by mouth every 4 hours as needed for severe pain     Sertraline HCl (ZOLOFT PO) Take 150 mg by mouth daily     warfarin ANTICOAGULANT (COUMADIN) 7.5 MG tablet Take 7.5 mg by mouth daily Patient takes 7.5 mg on Thursday, Saturday, Sunday and 5 mg on Monday, Wednesday.  Follow-up INR on 8/9/2021.     No current facility-administered medications for this visit.       REVIEW OF SYSTEMS:   Review of Systems  No fevers or chills. No headache, lightheadedness or dizziness. No SOB, chest pains or palpitations. Appetite is good. No nausea, vomiting. Constipation improved with prune juice.  No dysuria, frequency, burning or pain with urination.  Pain control with Tylenol.  Lower extremity edema.  Otherwise review of systems are negative.     PHYSICAL  "EXAMINATION:  Physical Exam     Vital signs: BP (!) 147/71   Pulse 88   Temp 97.7  F (36.5  C)   Resp 16   Ht 1.803 m (5' 11\")   Wt (!) 152.9 kg (337 lb)   SpO2 97%   BMI 47.00 kg/m    General: Awake, Alert, oriented x3, appropriately, follows simple commands, conversant  HEENT: Pink conjunctiva, anicteric sclerae, moist oral mucosa  NECK: Supple  CVS:  S1  S2, without murmur or gallop.   LUNG: Clear to auscultation, No wheezes, rales or rhonci.  BACK: No kyphosis of the thoracic spine  ABDOMEN: Soft, obese, nontender to palpation, with positive bowel sounds  EXTREMITIES: Moves both upper and lower extremities with some limitation, 2+ pedal edema to surgical side, no calf tenderness.  Tubigrip on.  Prosthesis to left lower extremity with history of BKA.  SKIN: Incision to right knee intact with glue, small amount of serosanguineous drainage to distal incision.  No redness or warmth.  NEUROLOGIC: Intact, pulses palpable  PSYCHIATRIC: Cognition intact      Labs:  All labs reviewed in the nursing home record and Epic     Assessment/plan:    ICD-10-CM    1. Primary osteoarthritis of right knee  M17.11  Continue Tylenol for pain.   2. S/P total knee arthroplasty, right  Z96.651  Continue with dry sterile dressing.  Tubigrip for compression.   3. Hx of BKA, left (H)  Z89.512  Continues in prosthesis.   4. Type 2 diabetes mellitus with hyperglycemia, with long-term current use of insulin (H)  E11.65  Continues on insulin pump.  Aspart 16 at brunch and supper.  12 units at breakfast.  And in the afternoon.  Continues on Lantus 45 units nightly.   Also continue sliding scale meals.    Z79.4    5. Paroxysmal atrial fibrillation (H)  I48.0  Continues on chronic anticoagulation.  INRs managed per the Coumadin clinic.   6. Essential hypertension  I10  satisfactory control.       This note has been dictated using voice recognition software. Any grammatical or context distortions are unintentional and inherent to the " software    Electronically signed by: Tram Guy, CNP

## 2022-05-19 ENCOUNTER — DISCHARGE SUMMARY NURSING HOME (OUTPATIENT)
Dept: GERIATRICS | Facility: CLINIC | Age: 72
End: 2022-05-19
Payer: MEDICARE

## 2022-05-19 VITALS
BODY MASS INDEX: 44.1 KG/M2 | TEMPERATURE: 96.5 F | WEIGHT: 315 LBS | HEART RATE: 85 BPM | RESPIRATION RATE: 16 BRPM | HEIGHT: 71 IN | OXYGEN SATURATION: 95 % | DIASTOLIC BLOOD PRESSURE: 81 MMHG | SYSTOLIC BLOOD PRESSURE: 126 MMHG

## 2022-05-19 DIAGNOSIS — M17.11 PRIMARY OSTEOARTHRITIS OF RIGHT KNEE: Primary | ICD-10-CM

## 2022-05-19 DIAGNOSIS — I10 ESSENTIAL HYPERTENSION: ICD-10-CM

## 2022-05-19 DIAGNOSIS — Z79.4 TYPE 2 DIABETES MELLITUS WITH HYPERGLYCEMIA, WITH LONG-TERM CURRENT USE OF INSULIN (H): ICD-10-CM

## 2022-05-19 DIAGNOSIS — Z96.651 S/P TOTAL KNEE ARTHROPLASTY, RIGHT: ICD-10-CM

## 2022-05-19 DIAGNOSIS — I48.0 PAROXYSMAL ATRIAL FIBRILLATION (H): ICD-10-CM

## 2022-05-19 DIAGNOSIS — E11.65 TYPE 2 DIABETES MELLITUS WITH HYPERGLYCEMIA, WITH LONG-TERM CURRENT USE OF INSULIN (H): ICD-10-CM

## 2022-05-19 DIAGNOSIS — N18.9 CHRONIC KIDNEY DISEASE, UNSPECIFIED CKD STAGE: ICD-10-CM

## 2022-05-19 DIAGNOSIS — Z89.512 HX OF BKA, LEFT (H): ICD-10-CM

## 2022-05-19 PROCEDURE — 99316 NF DSCHRG MGMT 30 MIN+: CPT | Performed by: NURSE PRACTITIONER

## 2022-05-19 NOTE — PROGRESS NOTES
[facility]    Code Status:  FULL CODE   Visit Type:   Chief Complaint   Patient presents with     Discharge Summary Nursing Home     Facility:  [facility] ([facility]) [[id number]]           Transitional Care Course: Oliver Lawson is a 71 year old male who I am seeing today for discharge from the TCU.  Patient recently hospitalized on 5/3/2022 at [facility] secondary to osteoarthritis of the right knee who underwent right TKA.  Past medical history includes diabetes mellitus type 2 with severe sensory neuropathy of the feet, hyperlipidemia, sleep apnea on CPAP, rheumatoid arthritis, hypertension, GERD, COPD, AV block third-degree status post pacemaker, proximal atrial fib, BPH, morbid obesity and below the knee amputation.  Preoperative x-rays revealed arthritic changes in the right knee.  Patient status post right TKA.  He tolerated the procedure well.  Postoperatively he did have some pain management issues.  Now tolerating only Tylenol.  He reports today he did not tolerate oxycodone very well.  Hemoglobin stable at 12.4 postoperatively.  History of atrial fib.  Continues on aspirin and Coumadin.  INR is managed per the Coumadin clinic.  Diabetes mellitus type 2.  He does have an insulin pump.  Sleep apnea on CPAP.  Hypertension satisfactory.    On today's visit patient sitting up in wheelchair.  Patient status post right TKA.  Patient had a follow-up with Ortho yesterday.  No new orders.  Incision dry and intact.  Continues on Tylenol for pain.  He does have some lower extremity swelling on the operative side.  This is improving.  History of left BKA.  Underlying atrial fib.  He continues on aspirin and Coumadin.  He is on metoprolol for rate control.  Diabetes mellitus type 2.  Patient continues on Lantus, short acting insulin and sliding scale.  Blood sugars have been in the 200s however patient reports he has been noncompliant with his diabetic diet.  Hypertension.  Blood pressure  controlled.  Chronic kidney disease.  Stable.    Active Ambulatory Problems     Diagnosis Date Noted     Traumatic hematoma 07/15/2021     ACP (advance care planning) 11/07/2005     Type 2 diabetes mellitus with hyperglycemia (H) 07/19/2021     Renal insufficiency syndrome 01/10/2011     COPD (chronic obstructive pulmonary disease) (H) 12/29/2014     Obesity, morbid (more than 100 lbs over ideal weight or BMI > 40) (H) 12/17/2010     Paroxysmal atrial fibrillation (H) 05/03/2016     Abscess of plantar aspect of foot 08/03/2021     Alcohol dependence (H) 12/13/2010     AV block, 3rd degree (H) 02/07/2016     Bilateral hearing loss 04/25/2016     BPH (benign prostatic hyperplasia) 07/21/2017     Charcot foot due to diabetes mellitus (H) 08/03/2021     Diabetes mellitus without complication (H) 02/07/2005     Fall 07/12/2021     Fall with injury 07/13/2021     GERD (gastroesophageal reflux disease) 03/12/2013     Hypertension 12/13/2010     Low vision, both eyes 02/17/2016     Dysthymic disorder 08/03/2021     Hyperlipidemia 02/07/2005     TGA (transient global amnesia) 11/16/2014     Surgical aftercare, musculoskeletal system 02/28/2019     Superficial bruising 07/12/2021     Spiral fracture of shaft of femur (H) 12/06/2018     Sleep apnea 08/03/2021     S/P placement of cardiac pacemaker 08/29/2016     S/P below knee amputation, left (H) 07/03/2018     Rheumatoid arthritis (H) 01/04/2007     Pre-ulcerative calluses 04/14/2014     Other diseases of lung, not elsewhere classified 11/14/2005     Osteomyelitis of right foot (H) 08/03/2021     Nonspecific abnormal electrocardiogram (ECG) (EKG) 08/03/2021     MRSA (methicillin resistant Staphylococcus aureus) infection 11/23/2012     Lower limb amputation, great toe (H) 03/02/2011     Morbid obesity with BMI of 40.0-44.9, adult (H) 12/07/2018     A-fib (H) 05/06/2022     Umbilical hernia 01/03/2022     Osteoarthritis of right knee 05/03/2022     Resolved Ambulatory  Problems     Diagnosis Date Noted     No Resolved Ambulatory Problems     Past Medical History:   Diagnosis Date     Dyspnea on exertion      Gastro-oesophageal reflux disease      Methicillin resistant Staphylococcus aureus in conditions classified elsewhere and of unspecified site      Morbid obesity (H)      Numbness and tingling      Open wound of foot except toe(s) alone, complicated      Other and unspecified alcohol dependence, unspecified drinking behavior      Other and unspecified hyperlipidemia      Renal insufficiency      Rheumatoid arthritis (H)      Shortness of breath      Type II or unspecified type diabetes mellitus without mention of complication, not stated as uncontrolled      Unspecified pleural effusion        Current Outpatient Medications:      acetaminophen (TYLENOL) 500 MG tablet, Take 1,000 mg by mouth every 6 hours as needed for mild pain, Disp: , Rfl:      albuterol (PROAIR HFA/PROVENTIL HFA/VENTOLIN HFA) 108 (90 Base) MCG/ACT inhaler, Inhale 2 puffs into the lungs every 6 hours as needed for shortness of breath / dyspnea or wheezing, Disp: , Rfl:      amLODIPine (NORVASC) 10 MG tablet, Take 10 mg by mouth daily, Disp: , Rfl:      Atorvastatin Calcium (LIPITOR PO), Take 80 mg by mouth daily, Disp: , Rfl:      cetirizine (ZYRTEC) 10 MG tablet, Take 10 mg by mouth daily, Disp: , Rfl:      famotidine (PEPCID) 20 MG tablet, Take 20 mg by mouth 2 times daily, Disp: , Rfl:      folic acid (FOLVITE) 1 MG tablet, Take 1 mg by mouth daily, Disp: , Rfl:      insulin aspart (NOVOLOG PEN) 100 UNIT/ML pen, Inject 16 Units Subcutaneous 3 times daily (with meals) Give 16 units at breakfast in the afternoon.  12 units prelunch., Disp: , Rfl:      insulin aspart (NOVOLOG VIAL) 100 UNITS/ML vial, Inject Subcutaneous 3 times daily (with meals) 16 UNITS BREAKFAST 12 UNITS LUNCH 16 UNITS DINNER And give TID Per Sliding Scale; If Blood Sugar is 150 to 199, give 3 Units. If Blood Sugar is 200 to 299, give 6  Units. If Blood Sugar is 300 to 399, give 9 Units. Special Instructions: blood glucose dose < 70. see hypoglycemia protocol  no insulin give prandial insulin if ordered, Disp: , Rfl:      insulin glargine (LANTUS) SOLN 100 UNIT/ML, Inject 45 Units Subcutaneous At Bedtime, Disp: , Rfl:      losartan (COZAAR) 50 MG tablet, Take 50 mg by mouth daily, Disp: , Rfl:      Metoprolol Succinate (TOPROL XL PO), Take 50 mg by mouth daily, Disp: , Rfl:      multivitamin w/minerals (THERA-VIT-M) tablet, Take 1 tablet by mouth daily, Disp: , Rfl:      oxyCODONE (ROXICODONE) 5 MG tablet, Take 5-10 mg by mouth every 4 hours as needed for severe pain, Disp: , Rfl:      Sertraline HCl (ZOLOFT PO), Take 150 mg by mouth daily, Disp: , Rfl:      warfarin ANTICOAGULANT (COUMADIN) 7.5 MG tablet, Take 7.5 mg by mouth daily Patient takes 7.5 mg on Thursday, Saturday, Sunday and 5 mg on Monday, Wednesday.  Follow-up INR on 8/9/2021., Disp: , Rfl:   Allergies   Allergen Reactions     Angiotensin Receptor Blockers      ARB-Angiotensin Receptor Antagonist     Contrast Dye      Diatrizoate Other (See Comments)     Patient recommended by a past doctor to not use Contrast Dye due to body chemistry    -- seriously? Someone check into this     Ace Inhibitors Cough     Changed to ARB.        All Meds and Allergies reviewed in the record at the facility and is the most up-to-date.      Current Outpatient Medications   Medication Sig     acetaminophen (TYLENOL) 500 MG tablet Take 1,000 mg by mouth every 6 hours as needed for mild pain     albuterol (PROAIR HFA/PROVENTIL HFA/VENTOLIN HFA) 108 (90 Base) MCG/ACT inhaler Inhale 2 puffs into the lungs every 6 hours as needed for shortness of breath / dyspnea or wheezing     amLODIPine (NORVASC) 10 MG tablet Take 10 mg by mouth daily     Atorvastatin Calcium (LIPITOR PO) Take 80 mg by mouth daily     cetirizine (ZYRTEC) 10 MG tablet Take 10 mg by mouth daily     famotidine (PEPCID) 20 MG tablet Take 20  "mg by mouth 2 times daily     folic acid (FOLVITE) 1 MG tablet Take 1 mg by mouth daily     insulin aspart (NOVOLOG PEN) 100 UNIT/ML pen Inject 16 Units Subcutaneous 3 times daily (with meals) Give 16 units at breakfast in the afternoon.  12 units prelunch.     insulin aspart (NOVOLOG VIAL) 100 UNITS/ML vial Inject Subcutaneous 3 times daily (with meals) 16 UNITS BREAKFAST  12 UNITS LUNCH  16 UNITS DINNER  And give TID Per Sliding Scale;  If Blood Sugar is 150 to 199, give 3 Units.  If Blood Sugar is 200 to 299, give 6 Units.  If Blood Sugar is 300 to 399, give 9 Units.  Special Instructions: blood glucose dose < 70. see hypoglycemia protocol  no insulin give prandial insulin if ordered     insulin glargine (LANTUS) SOLN 100 UNIT/ML Inject 45 Units Subcutaneous At Bedtime     losartan (COZAAR) 50 MG tablet Take 50 mg by mouth daily     Metoprolol Succinate (TOPROL XL PO) Take 50 mg by mouth daily     multivitamin w/minerals (THERA-VIT-M) tablet Take 1 tablet by mouth daily     oxyCODONE (ROXICODONE) 5 MG tablet Take 5-10 mg by mouth every 4 hours as needed for severe pain     Sertraline HCl (ZOLOFT PO) Take 150 mg by mouth daily     warfarin ANTICOAGULANT (COUMADIN) 7.5 MG tablet Take 7.5 mg by mouth daily Patient takes 7.5 mg on Thursday, Saturday, Sunday and 5 mg on Monday, Wednesday.  Follow-up INR on 8/9/2021.     No current facility-administered medications for this visit.       REVIEW OF SYSTEMS:   Review of Systems  No fevers or chills. No headache, lightheadedness or dizziness. No SOB, chest pains or palpitations. Appetite is good. No nausea, vomiting. Constipation improved with prune juice.  No dysuria, frequency, burning or pain with urination.  Pain control with Tylenol.  Lower extremity edema.  Otherwise review of systems are negative.     PHYSICAL EXAMINATION:  Physical Exam     Vital signs: /81   Pulse 85   Temp (!) 96.5  F (35.8  C)   Resp 16   Ht 1.803 m (5' 11\")   Wt (!) 152.9 kg " (337 lb)   SpO2 95%   BMI 47.00 kg/m    General: Awake, Alert, oriented x3, appropriately, follows simple commands, conversant  HEENT: Pink conjunctiva, anicteric sclerae, moist oral mucosa  NECK: Supple  CVS:  S1  S2, without murmur or gallop.   LUNG: Clear to auscultation, No wheezes, rales or rhonci.  BACK: No kyphosis of the thoracic spine  ABDOMEN: Soft, obese, nontender to palpation, with positive bowel sounds  EXTREMITIES: Moves both upper and lower extremities with some limitation, 2+ pedal edema to surgical side, no calf tenderness.  Tubigrip on.  Prosthesis to left lower extremity with history of BKA.  SKIN: Incision to right knee intact with glue, small amount of serosanguineous drainage to distal incision.  No redness or warmth.  NEUROLOGIC: Intact, pulses palpable  PSYCHIATRIC: Cognition intact      Labs:  All labs reviewed in the nursing home record and Epic     Assessment/plan:    ICD-10-CM    1. Primary osteoarthritis of right knee  M17.11  Continue Tylenol for pain.  Patient had a follow-up with Ortho yesterday.  No new orders.   2. S/P total knee arthroplasty, right  Z96.651  Continue with dry sterile dressing.  Tubigrip for compression.   3. Hx of BKA, left (H)  Z89.512  Continues in prosthesis.   4. Type 2 diabetes mellitus with hyperglycemia, with long-term current use of insulin (H)  E11.65  Continues on insulin pump.  Aspart 16 at brunch and supper.  12 units at breakfast.  And in the afternoon.  Continues on Lantus 45 units nightly.   Also continue sliding scale meals.    Z79.4    5. Paroxysmal atrial fibrillation (H)  I48.0  Continues on chronic anticoagulation.  INRs managed per the Coumadin clinic.   6. Essential hypertension  I10  satisfactory control.     Okay to NH home with current meds and treatment.  Home PT, OT, home health aide and RN.  Follow-up with primary care provider in 1 week.  Follow-up in 1 month with Ortho.    DISCHARGE PLAN/FACE TO FACE:  I certify that this patient is  under my care and that I, or a nurse practitioner or physician's assistant working with me, had a face-to-face encounter that meets the physician face-to-face encounter requirements with this patient.       I certify that, based on my findings, the following services are medically necessary home health services.    My clinical findings support the need for the above skilled services.    This patient is homebound because: Recent right total knee arthroplasty secondary to osteoarthritis.    The patient is, or has been, under my care and I have initiated the establishment of the plan of care. This patient will be followed by a physician who will periodically review the plan of care.    Total time spent for this visit was 35 minutes with greater than 50% of time spent face-to-face patient reviewing discharge medications, home care services and follow-ups.    This note has been dictated using voice recognition software. Any grammatical or context distortions are unintentional and inherent to the software    Electronically signed by: Tram Guy CNP

## 2022-05-19 NOTE — LETTER
5/19/2022        RE: Oliver Lawson  [address]  [place] [address]        [facility]    Code Status:  FULL CODE   Visit Type:   Chief Complaint   Patient presents with     Discharge Summary Nursing Home     Facility:  [facility] [facility] ([facility]) [[id number]]           Transitional Care Course: Oliver Lawson is a 71 year old male who I am seeing today for discharge from the TCU.  Patient recently hospitalized on 5/3/2022 at [facility] secondary to osteoarthritis of the right knee who underwent right TKA.  Past medical history includes diabetes mellitus type 2 with severe sensory neuropathy of the feet, hyperlipidemia, sleep apnea on CPAP, rheumatoid arthritis, hypertension, GERD, COPD, AV block third-degree status post pacemaker, proximal atrial fib, BPH, morbid obesity and below the knee amputation.  Preoperative x-rays revealed arthritic changes in the right knee.  Patient status post right TKA.  He tolerated the procedure well.  Postoperatively he did have some pain management issues.  Now tolerating only Tylenol.  He reports today he did not tolerate oxycodone very well.  Hemoglobin stable at 12.4 postoperatively.  History of atrial fib.  Continues on aspirin and Coumadin.  INR is managed per the Coumadin clinic.  Diabetes mellitus type 2.  He does have an insulin pump.  Sleep apnea on CPAP.  Hypertension satisfactory.    On today's visit patient sitting up in wheelchair.  Patient status post right TKA.  Patient had a follow-up with Ortho yesterday.  No new orders.  Incision dry and intact.  Continues on Tylenol for pain.  He does have some lower extremity swelling on the operative side.  This is improving.  History of left BKA.  Underlying atrial fib.  He continues on aspirin and Coumadin.  He is on metoprolol for rate control.  Diabetes mellitus type 2.  Patient continues on Lantus, short acting insulin and sliding scale.  Blood sugars have been in the 200s  however patient reports he has been noncompliant with his diabetic diet.  Hypertension.  Blood pressure controlled.  Chronic kidney disease.  Stable.    Active Ambulatory Problems     Diagnosis Date Noted     Traumatic hematoma 07/15/2021     ACP (advance care planning) 11/07/2005     Type 2 diabetes mellitus with hyperglycemia (H) 07/19/2021     Renal insufficiency syndrome 01/10/2011     COPD (chronic obstructive pulmonary disease) (H) 12/29/2014     Obesity, morbid (more than 100 lbs over ideal weight or BMI > 40) (H) 12/17/2010     Paroxysmal atrial fibrillation (H) 05/03/2016     Abscess of plantar aspect of foot 08/03/2021     Alcohol dependence (H) 12/13/2010     AV block, 3rd degree (H) 02/07/2016     Bilateral hearing loss 04/25/2016     BPH (benign prostatic hyperplasia) 07/21/2017     Charcot foot due to diabetes mellitus (H) 08/03/2021     Diabetes mellitus without complication (H) 02/07/2005     Fall 07/12/2021     Fall with injury 07/13/2021     GERD (gastroesophageal reflux disease) 03/12/2013     Hypertension 12/13/2010     Low vision, both eyes 02/17/2016     Dysthymic disorder 08/03/2021     Hyperlipidemia 02/07/2005     TGA (transient global amnesia) 11/16/2014     Surgical aftercare, musculoskeletal system 02/28/2019     Superficial bruising 07/12/2021     Spiral fracture of shaft of femur (H) 12/06/2018     Sleep apnea 08/03/2021     S/P placement of cardiac pacemaker 08/29/2016     S/P below knee amputation, left (H) 07/03/2018     Rheumatoid arthritis (H) 01/04/2007     Pre-ulcerative calluses 04/14/2014     Other diseases of lung, not elsewhere classified 11/14/2005     Osteomyelitis of right foot (H) 08/03/2021     Nonspecific abnormal electrocardiogram (ECG) (EKG) 08/03/2021     MRSA (methicillin resistant Staphylococcus aureus) infection 11/23/2012     Lower limb amputation, great toe (H) 03/02/2011     Morbid obesity with BMI of 40.0-44.9, adult (H) 12/07/2018     A-fib (H) 05/06/2022      Umbilical hernia 01/03/2022     Osteoarthritis of right knee 05/03/2022     Resolved Ambulatory Problems     Diagnosis Date Noted     No Resolved Ambulatory Problems     Past Medical History:   Diagnosis Date     Dyspnea on exertion      Gastro-oesophageal reflux disease      Methicillin resistant Staphylococcus aureus in conditions classified elsewhere and of unspecified site      Morbid obesity (H)      Numbness and tingling      Open wound of foot except toe(s) alone, complicated      Other and unspecified alcohol dependence, unspecified drinking behavior      Other and unspecified hyperlipidemia      Renal insufficiency      Rheumatoid arthritis (H)      Shortness of breath      Type II or unspecified type diabetes mellitus without mention of complication, not stated as uncontrolled      Unspecified pleural effusion        Current Outpatient Medications:      acetaminophen (TYLENOL) 500 MG tablet, Take 1,000 mg by mouth every 6 hours as needed for mild pain, Disp: , Rfl:      albuterol (PROAIR HFA/PROVENTIL HFA/VENTOLIN HFA) 108 (90 Base) MCG/ACT inhaler, Inhale 2 puffs into the lungs every 6 hours as needed for shortness of breath / dyspnea or wheezing, Disp: , Rfl:      amLODIPine (NORVASC) 10 MG tablet, Take 10 mg by mouth daily, Disp: , Rfl:      Atorvastatin Calcium (LIPITOR PO), Take 80 mg by mouth daily, Disp: , Rfl:      cetirizine (ZYRTEC) 10 MG tablet, Take 10 mg by mouth daily, Disp: , Rfl:      famotidine (PEPCID) 20 MG tablet, Take 20 mg by mouth 2 times daily, Disp: , Rfl:      folic acid (FOLVITE) 1 MG tablet, Take 1 mg by mouth daily, Disp: , Rfl:      insulin aspart (NOVOLOG PEN) 100 UNIT/ML pen, Inject 16 Units Subcutaneous 3 times daily (with meals) Give 16 units at breakfast in the afternoon.  12 units prelunch., Disp: , Rfl:      insulin aspart (NOVOLOG VIAL) 100 UNITS/ML vial, Inject Subcutaneous 3 times daily (with meals) 16 UNITS BREAKFAST 12 UNITS LUNCH 16 UNITS DINNER And give TID Per  Sliding Scale; If Blood Sugar is 150 to 199, give 3 Units. If Blood Sugar is 200 to 299, give 6 Units. If Blood Sugar is 300 to 399, give 9 Units. Special Instructions: blood glucose dose < 70. see hypoglycemia protocol  no insulin give prandial insulin if ordered, Disp: , Rfl:      insulin glargine (LANTUS) SOLN 100 UNIT/ML, Inject 45 Units Subcutaneous At Bedtime, Disp: , Rfl:      losartan (COZAAR) 50 MG tablet, Take 50 mg by mouth daily, Disp: , Rfl:      Metoprolol Succinate (TOPROL XL PO), Take 50 mg by mouth daily, Disp: , Rfl:      multivitamin w/minerals (THERA-VIT-M) tablet, Take 1 tablet by mouth daily, Disp: , Rfl:      oxyCODONE (ROXICODONE) 5 MG tablet, Take 5-10 mg by mouth every 4 hours as needed for severe pain, Disp: , Rfl:      Sertraline HCl (ZOLOFT PO), Take 150 mg by mouth daily, Disp: , Rfl:      warfarin ANTICOAGULANT (COUMADIN) 7.5 MG tablet, Take 7.5 mg by mouth daily Patient takes 7.5 mg on Thursday, Saturday, Sunday and 5 mg on Monday, Wednesday.  Follow-up INR on 8/9/2021., Disp: , Rfl:   Allergies   Allergen Reactions     Angiotensin Receptor Blockers      ARB-Angiotensin Receptor Antagonist     Contrast Dye      Diatrizoate Other (See Comments)     Patient recommended by a past doctor to not use Contrast Dye due to body chemistry    -- seriously? Someone check into this     Ace Inhibitors Cough     Changed to ARB.        All Meds and Allergies reviewed in the record at the facility and is the most up-to-date.      Current Outpatient Medications   Medication Sig     acetaminophen (TYLENOL) 500 MG tablet Take 1,000 mg by mouth every 6 hours as needed for mild pain     albuterol (PROAIR HFA/PROVENTIL HFA/VENTOLIN HFA) 108 (90 Base) MCG/ACT inhaler Inhale 2 puffs into the lungs every 6 hours as needed for shortness of breath / dyspnea or wheezing     amLODIPine (NORVASC) 10 MG tablet Take 10 mg by mouth daily     Atorvastatin Calcium (LIPITOR PO) Take 80 mg by mouth daily      cetirizine (ZYRTEC) 10 MG tablet Take 10 mg by mouth daily     famotidine (PEPCID) 20 MG tablet Take 20 mg by mouth 2 times daily     folic acid (FOLVITE) 1 MG tablet Take 1 mg by mouth daily     insulin aspart (NOVOLOG PEN) 100 UNIT/ML pen Inject 16 Units Subcutaneous 3 times daily (with meals) Give 16 units at breakfast in the afternoon.  12 units prelunch.     insulin aspart (NOVOLOG VIAL) 100 UNITS/ML vial Inject Subcutaneous 3 times daily (with meals) 16 UNITS BREAKFAST  12 UNITS LUNCH  16 UNITS DINNER  And give TID Per Sliding Scale;  If Blood Sugar is 150 to 199, give 3 Units.  If Blood Sugar is 200 to 299, give 6 Units.  If Blood Sugar is 300 to 399, give 9 Units.  Special Instructions: blood glucose dose < 70. see hypoglycemia protocol  no insulin give prandial insulin if ordered     insulin glargine (LANTUS) SOLN 100 UNIT/ML Inject 45 Units Subcutaneous At Bedtime     losartan (COZAAR) 50 MG tablet Take 50 mg by mouth daily     Metoprolol Succinate (TOPROL XL PO) Take 50 mg by mouth daily     multivitamin w/minerals (THERA-VIT-M) tablet Take 1 tablet by mouth daily     oxyCODONE (ROXICODONE) 5 MG tablet Take 5-10 mg by mouth every 4 hours as needed for severe pain     Sertraline HCl (ZOLOFT PO) Take 150 mg by mouth daily     warfarin ANTICOAGULANT (COUMADIN) 7.5 MG tablet Take 7.5 mg by mouth daily Patient takes 7.5 mg on Thursday, Saturday, Sunday and 5 mg on Monday, Wednesday.  Follow-up INR on 8/9/2021.     No current facility-administered medications for this visit.       REVIEW OF SYSTEMS:   Review of Systems  No fevers or chills. No headache, lightheadedness or dizziness. No SOB, chest pains or palpitations. Appetite is good. No nausea, vomiting. Constipation improved with prune juice.  No dysuria, frequency, burning or pain with urination.  Pain control with Tylenol.  Lower extremity edema.  Otherwise review of systems are negative.     PHYSICAL EXAMINATION:  Physical Exam     Vital signs: BP  "126/81   Pulse 85   Temp (!) 96.5  F (35.8  C)   Resp 16   Ht 1.803 m (5' 11\")   Wt (!) 152.9 kg (337 lb)   SpO2 95%   BMI 47.00 kg/m    General: Awake, Alert, oriented x3, appropriately, follows simple commands, conversant  HEENT: Pink conjunctiva, anicteric sclerae, moist oral mucosa  NECK: Supple  CVS:  S1  S2, without murmur or gallop.   LUNG: Clear to auscultation, No wheezes, rales or rhonci.  BACK: No kyphosis of the thoracic spine  ABDOMEN: Soft, obese, nontender to palpation, with positive bowel sounds  EXTREMITIES: Moves both upper and lower extremities with some limitation, 2+ pedal edema to surgical side, no calf tenderness.  Tubigrip on.  Prosthesis to left lower extremity with history of BKA.  SKIN: Incision to right knee intact with glue, small amount of serosanguineous drainage to distal incision.  No redness or warmth.  NEUROLOGIC: Intact, pulses palpable  PSYCHIATRIC: Cognition intact      Labs:  All labs reviewed in the nursing home record and Epic     Assessment/plan:    ICD-10-CM    1. Primary osteoarthritis of right knee  M17.11  Continue Tylenol for pain.  Patient had a follow-up with Ortho yesterday.  No new orders.   2. S/P total knee arthroplasty, right  Z96.651  Continue with dry sterile dressing.  Tubigrip for compression.   3. Hx of BKA, left (H)  Z89.512  Continues in prosthesis.   4. Type 2 diabetes mellitus with hyperglycemia, with long-term current use of insulin (H)  E11.65  Continues on insulin pump.  Aspart 16 at brunch and supper.  12 units at breakfast.  And in the afternoon.  Continues on Lantus 45 units nightly.   Also continue sliding scale meals.    Z79.4    5. Paroxysmal atrial fibrillation (H)  I48.0  Continues on chronic anticoagulation.  INRs managed per the Coumadin clinic.   6. Essential hypertension  I10  satisfactory control.     Okay to DC home with current meds and treatment.  Home PT, OT, home health aide and RN.  Follow-up with primary care provider in 1 " week.  Follow-up in 1 month with Ortho.    DISCHARGE PLAN/FACE TO FACE:  I certify that this patient is under my care and that I, or a nurse practitioner or physician's assistant working with me, had a face-to-face encounter that meets the physician face-to-face encounter requirements with this patient.       I certify that, based on my findings, the following services are medically necessary home health services.    My clinical findings support the need for the above skilled services.    This patient is homebound because: Recent right total knee arthroplasty secondary to osteoarthritis.    The patient is, or has been, under my care and I have initiated the establishment of the plan of care. This patient will be followed by a physician who will periodically review the plan of care.    Total time spent for this visit was 35 minutes with greater than 50% of time spent face-to-face patient reviewing discharge medications, home care services and follow-ups.    This note has been dictated using voice recognition software. Any grammatical or context distortions are unintentional and inherent to the software    Electronically signed by: Tram Guy CNP           Sincerely,        Tram Guy NP

## 2022-06-03 ENCOUNTER — DOCUMENTATION ONLY (OUTPATIENT)
Dept: ANTICOAGULATION | Facility: CLINIC | Age: 72
End: 2022-06-03
Payer: MEDICARE

## 2022-06-03 DIAGNOSIS — I48.91 A-FIB (H): Primary | ICD-10-CM

## 2022-06-03 NOTE — PROGRESS NOTES
ANTICOAGULATION  MANAGEMENT    Oliver Lawson is being discharged from the [facility] Anticoagulation Management Program (ACC).    Reason for discharge: discharged from [facility]; returning to pre-admission warfarin management    Anticoagulation episode resolved and ACC referral closed    If patient needs warfarin management in the future, please send a new referral    Farhana Arciniega RN

## 2022-08-06 ENCOUNTER — HEALTH MAINTENANCE LETTER (OUTPATIENT)
Age: 72
End: 2022-08-06

## 2022-09-19 NOTE — PROGRESS NOTES
[facility] GERIATRIC SERVICES    Facility:  [facility] ([facility]) [[id number]]  Code Status: FULL CODE      CHIEF COMPLAINT/REASON FOR VISIT:  Chief Complaint   Patient presents with     Hospital F/U       HPI:   Oliver is a 70 year old male who was recently admitted to the hospital on 7/12/2021. He does have a history of type 2 diabetes with chronic kidney disease unspecified. He also had a left below the knee amputation 2018 is currently on Coumadin. He was admitted to the hospital after fall he sustained by inappropriately or not appropriately putting his sleeve on for his prosthesis he slipped out of the prosthesis and fell on the ground. He was brought to the hospital and have mild contusion with no ecchymosis. Orthotics consulted and patient was fitted with a smaller  and patient improved with ice and elevation. Warfarin was continued hematoma was noted and likely the hematoma occurred with initial trauma no signs of any further bleeding. He is to follow-up with orthotics. He was discharged on reduced insulin dose of 45 units of Lantus and 12 units aspart. His blood sugars have been running 94 up to 335. This morning it was 149. Patient was treated appropriately and transferred here to the TCU at [facility] in stable condition.    Patient is sitting in his chair comfortably he is alert and oriented does not appear to be in acute distress. He is moving his bowels well at this time urinating without difficulty. The stump is still swollen does not fit in the prosthesis anymore and pain is well managed. He denies any other new issues at this time.    Past Medical History:  Past Medical History:   Diagnosis Date     Dyspnea on exertion      Dysthymic disorder      Gastro-oesophageal reflux disease      Hypertension      Lower limb amputation, great toe (H)     H/O OSTEOMYELITIS     Methicillin resistant Staphylococcus aureus in conditions classified elsewhere and of unspecified site      Morbid  obesity (H)      Nonspecific abnormal electrocardiogram (ECG) (EKG)      Numbness and tingling      Open wound of foot except toe(s) alone, complicated      Other and unspecified alcohol dependence, unspecified drinking behavior      Other and unspecified hyperlipidemia      Other diseases of lung, not elsewhere classified     NODULES     Renal insufficiency      Rheumatoid arthritis (H)      Shortness of breath      Sleep apnea     USES CPAP     Type II or unspecified type diabetes mellitus without mention of complication, not stated as uncontrolled      Unspecified pleural effusion            Surgical History:  Past Surgical History:   Procedure Laterality Date     CHEILECTOMY  10/21/2013    Procedure: CHEILECTOMY;  RIGHT CHEILECTOMY, CALCANEAL CUBITAL  AND DEBRIDEMENT;  Surgeon: Katharina Agudelo MD;  Location: [facility]     ENT SURGERY      T&A     EYE SURGERY      LASER FOR DIABETIC RETINOPATHY     GENITOURINARY SURGERY      BILAT HYDROCELECTOMY/VASECTOMY     GI SURGERY      PILONIDAL CYST     HEAD & NECK SURGERY      WISDOM TEETH     HERNIA REPAIR       ORTHOPEDIC SURGERY      right  and left toes, right YUNG       Family History:   No family history on file.    Social History:    Social History     Socioeconomic History     Marital status:      Spouse name: Not on file     Number of children: Not on file     Years of education: Not on file     Highest education level: Not on file   Occupational History     Not on file   Tobacco Use     Smoking status: Former Smoker     Quit date: 1993     Years since quittin.5   Substance and Sexual Activity     Alcohol use: No     Comment: NOT SINCE 12/12/10     Drug use: No     Sexual activity: Not on file   Other Topics Concern     Not on file   Social History Narrative     Not on file     Social Determinants of Health     Financial Resource Strain:      Difficulty of Paying Living Expenses:    Food Insecurity:      Worried About Running Out of Food in the  Last Year:      Ran Out of Food in the Last Year:    Transportation Needs:      Lack of Transportation (Medical):      Lack of Transportation (Non-Medical):    Physical Activity:      Days of Exercise per Week:      Minutes of Exercise per Session:    Stress:      Feeling of Stress :    Social Connections:      Frequency of Communication with Friends and Family:      Frequency of Social Gatherings with Friends and Family:      Attends [employer] Services:      Active Member of Clubs or Organizations:      Attends Club or Organization Meetings:      Marital Status:    Intimate Partner Violence:      Fear of Current or Ex-Partner:      Emotionally Abused:      Physically Abused:      Sexually Abused:        REVIEW OF SYSTEM: Patient claims his pain is under adequate control denies any fever chills nausea vomit diarrhea change in vision hearing taste or smell weakness one-sided chest pain shortness of breath. Denies any current shortness stool polyphagia polydipsia polyuria depression or anxiety and the main review of systems is negative.      PHYSICAL EXAM: Patient is alert pleasant does not appear to be in acute distress. Her mucosa is moist nasal discharge neck is supple without lymphadenopathy or thyromegaly. Heart sounds are regular without any rubs murmurs or gallops lungs are clear to auscultation without any crackles rales or wheezes. Abdomen was obese nontender bowel sounds are positive. Right extremity did show some tenderness in the anterior part of the shin but no real calf tenderness and no thigh tenderness. Homans' sign was negative on the right. The left prosthesis had a stump  on it at this time but no signs of any tenderness but there was definitely palpable hematoma. Affect was pleasant neurologic seems nonfocal.        LABS: Hospital labs are as follows INR was 2.3 pro time was 24.9, blood sugars running all over the place. GFR was greater than 60, sodium was 137, potassium 4.8, CO2 is 23,  hemoglobin is 12.9,.      ASSESSMENT:    Encounter Diagnoses   Name Primary?     Leg hematoma, left, sequela      Pain management      Type 2 diabetes mellitus with hyperglycemia, with long-term current use of insulin (H)      Chronic kidney disease, unspecified CKD stage      Chronic bronchitis, unspecified chronic bronchitis type (H)      Morbid obesity (H)         PLAN: Plan at this time Coumadin clinic will manage INR.    Patient will continue on his same pain medication without any new changes.    Chronic kidney disease we will continue to monitor.    Chronic bronchitis not in exacerbation continue cares at this time.    Type 2 diabetes with hyperglycemia we will have nutrition follow for consistent carbohydrate diet and no changes in meds at this time.    I will continue to monitor above medical problems and no other changes to care plan at this time. We will follow his progress with therapy.        Electronically signed by: RIVERA LAY DO   No

## 2022-10-16 ENCOUNTER — HEALTH MAINTENANCE LETTER (OUTPATIENT)
Age: 72
End: 2022-10-16

## 2022-12-03 ENCOUNTER — HEALTH MAINTENANCE LETTER (OUTPATIENT)
Age: 72
End: 2022-12-03

## 2023-04-01 ENCOUNTER — HEALTH MAINTENANCE LETTER (OUTPATIENT)
Age: 73
End: 2023-04-01

## 2023-08-26 ENCOUNTER — HEALTH MAINTENANCE LETTER (OUTPATIENT)
Age: 73
End: 2023-08-26

## 2024-01-13 ENCOUNTER — HEALTH MAINTENANCE LETTER (OUTPATIENT)
Age: 74
End: 2024-01-13

## 2024-05-09 ENCOUNTER — LAB REQUISITION (OUTPATIENT)
Dept: LAB | Facility: CLINIC | Age: 74
End: 2024-05-09
Payer: MEDICARE

## 2024-05-09 DIAGNOSIS — I48.0 PAROXYSMAL ATRIAL FIBRILLATION (H): ICD-10-CM

## 2024-05-09 DIAGNOSIS — I10 ESSENTIAL (PRIMARY) HYPERTENSION: ICD-10-CM

## 2024-05-10 ENCOUNTER — LAB REQUISITION (OUTPATIENT)
Dept: LAB | Facility: CLINIC | Age: 74
End: 2024-05-10
Payer: MEDICARE

## 2024-05-10 DIAGNOSIS — E55.9 VITAMIN D DEFICIENCY, UNSPECIFIED: ICD-10-CM

## 2024-05-10 DIAGNOSIS — D64.9 ANEMIA, UNSPECIFIED: ICD-10-CM

## 2024-05-10 DIAGNOSIS — I10 ESSENTIAL (PRIMARY) HYPERTENSION: ICD-10-CM

## 2024-05-10 LAB
ANION GAP SERPL CALCULATED.3IONS-SCNC: 9 MMOL/L (ref 7–15)
BUN SERPL-MCNC: 22.7 MG/DL (ref 8–23)
CALCIUM SERPL-MCNC: 10 MG/DL (ref 8.8–10.2)
CHLORIDE SERPL-SCNC: 102 MMOL/L (ref 98–107)
CREAT SERPL-MCNC: 1.07 MG/DL (ref 0.67–1.17)
DEPRECATED HCO3 PLAS-SCNC: 24 MMOL/L (ref 22–29)
EGFRCR SERPLBLD CKD-EPI 2021: 72 ML/MIN/1.73M2
GLUCOSE SERPL-MCNC: 175 MG/DL (ref 70–99)
INR PPP: 1.94 (ref 0.85–1.15)
POTASSIUM SERPL-SCNC: 4.7 MMOL/L (ref 3.4–5.3)
SODIUM SERPL-SCNC: 135 MMOL/L (ref 135–145)

## 2024-05-10 PROCEDURE — 85610 PROTHROMBIN TIME: CPT | Performed by: FAMILY MEDICINE

## 2024-05-10 PROCEDURE — 36415 COLL VENOUS BLD VENIPUNCTURE: CPT | Performed by: FAMILY MEDICINE

## 2024-05-10 PROCEDURE — 80048 BASIC METABOLIC PNL TOTAL CA: CPT | Performed by: FAMILY MEDICINE

## 2024-05-10 PROCEDURE — P9604 ONE-WAY ALLOW PRORATED TRIP: HCPCS | Performed by: FAMILY MEDICINE

## 2024-05-13 LAB
ANION GAP SERPL CALCULATED.3IONS-SCNC: 8 MMOL/L (ref 7–15)
BUN SERPL-MCNC: 19.5 MG/DL (ref 8–23)
CALCIUM SERPL-MCNC: 11.3 MG/DL (ref 8.8–10.2)
CHLORIDE SERPL-SCNC: 103 MMOL/L (ref 98–107)
CREAT SERPL-MCNC: 1.05 MG/DL (ref 0.67–1.17)
DEPRECATED HCO3 PLAS-SCNC: 26 MMOL/L (ref 22–29)
EGFRCR SERPLBLD CKD-EPI 2021: 74 ML/MIN/1.73M2
ERYTHROCYTE [DISTWIDTH] IN BLOOD BY AUTOMATED COUNT: 15.7 % (ref 10–15)
GLUCOSE SERPL-MCNC: 88 MG/DL (ref 70–99)
HCT VFR BLD AUTO: 33.9 % (ref 40–53)
HGB BLD-MCNC: 10.4 G/DL (ref 13.3–17.7)
MCH RBC QN AUTO: 27.7 PG (ref 26.5–33)
MCHC RBC AUTO-ENTMCNC: 30.7 G/DL (ref 31.5–36.5)
MCV RBC AUTO: 90 FL (ref 78–100)
PLATELET # BLD AUTO: 318 10E3/UL (ref 150–450)
POTASSIUM SERPL-SCNC: 4.9 MMOL/L (ref 3.4–5.3)
RBC # BLD AUTO: 3.75 10E6/UL (ref 4.4–5.9)
SODIUM SERPL-SCNC: 137 MMOL/L (ref 135–145)
VIT D+METAB SERPL-MCNC: 44 NG/ML (ref 20–50)
WBC # BLD AUTO: 5.3 10E3/UL (ref 4–11)

## 2024-05-13 PROCEDURE — 85027 COMPLETE CBC AUTOMATED: CPT | Performed by: FAMILY MEDICINE

## 2024-05-13 PROCEDURE — 82306 VITAMIN D 25 HYDROXY: CPT | Performed by: FAMILY MEDICINE

## 2024-05-13 PROCEDURE — 80048 BASIC METABOLIC PNL TOTAL CA: CPT | Performed by: FAMILY MEDICINE

## 2024-05-13 PROCEDURE — 36415 COLL VENOUS BLD VENIPUNCTURE: CPT | Performed by: FAMILY MEDICINE

## 2024-05-13 PROCEDURE — P9604 ONE-WAY ALLOW PRORATED TRIP: HCPCS | Performed by: FAMILY MEDICINE

## 2024-05-16 ENCOUNTER — LAB REQUISITION (OUTPATIENT)
Dept: LAB | Facility: CLINIC | Age: 74
End: 2024-05-16
Payer: MEDICARE

## 2024-05-16 DIAGNOSIS — I48.0 PAROXYSMAL ATRIAL FIBRILLATION (H): ICD-10-CM

## 2024-05-17 ENCOUNTER — LAB REQUISITION (OUTPATIENT)
Dept: LAB | Facility: CLINIC | Age: 74
End: 2024-05-17
Payer: MEDICARE

## 2024-05-17 DIAGNOSIS — D64.9 ANEMIA, UNSPECIFIED: ICD-10-CM

## 2024-05-17 LAB — INR PPP: 3.42 (ref 0.85–1.15)

## 2024-05-17 PROCEDURE — P9604 ONE-WAY ALLOW PRORATED TRIP: HCPCS | Performed by: FAMILY MEDICINE

## 2024-05-17 PROCEDURE — 36415 COLL VENOUS BLD VENIPUNCTURE: CPT | Performed by: FAMILY MEDICINE

## 2024-05-17 PROCEDURE — 85610 PROTHROMBIN TIME: CPT | Performed by: FAMILY MEDICINE

## 2024-05-20 LAB
ERYTHROCYTE [DISTWIDTH] IN BLOOD BY AUTOMATED COUNT: 16 % (ref 10–15)
HCT VFR BLD AUTO: 34.9 % (ref 40–53)
HGB BLD-MCNC: 10.8 G/DL (ref 13.3–17.7)
MCH RBC QN AUTO: 28.1 PG (ref 26.5–33)
MCHC RBC AUTO-ENTMCNC: 30.9 G/DL (ref 31.5–36.5)
MCV RBC AUTO: 91 FL (ref 78–100)
PLATELET # BLD AUTO: 250 10E3/UL (ref 150–450)
RBC # BLD AUTO: 3.84 10E6/UL (ref 4.4–5.9)
WBC # BLD AUTO: 5.8 10E3/UL (ref 4–11)

## 2024-05-20 PROCEDURE — P9604 ONE-WAY ALLOW PRORATED TRIP: HCPCS | Performed by: FAMILY MEDICINE

## 2024-05-20 PROCEDURE — 36415 COLL VENOUS BLD VENIPUNCTURE: CPT | Performed by: FAMILY MEDICINE

## 2024-05-20 PROCEDURE — 85027 COMPLETE CBC AUTOMATED: CPT | Performed by: FAMILY MEDICINE

## 2024-05-23 ENCOUNTER — LAB REQUISITION (OUTPATIENT)
Dept: LAB | Facility: CLINIC | Age: 74
End: 2024-05-23
Payer: MEDICARE

## 2024-05-23 DIAGNOSIS — I48.0 PAROXYSMAL ATRIAL FIBRILLATION (H): ICD-10-CM

## 2024-05-24 LAB — INR PPP: 4.47 (ref 0.85–1.15)

## 2024-05-24 PROCEDURE — 85610 PROTHROMBIN TIME: CPT | Performed by: FAMILY MEDICINE

## 2024-05-24 PROCEDURE — 36415 COLL VENOUS BLD VENIPUNCTURE: CPT | Performed by: FAMILY MEDICINE

## 2024-05-24 PROCEDURE — P9604 ONE-WAY ALLOW PRORATED TRIP: HCPCS | Performed by: FAMILY MEDICINE

## 2024-05-28 ENCOUNTER — LAB REQUISITION (OUTPATIENT)
Dept: LAB | Facility: CLINIC | Age: 74
End: 2024-05-28
Payer: MEDICARE

## 2024-05-28 DIAGNOSIS — E83.52 HYPERCALCEMIA: ICD-10-CM

## 2024-05-30 ENCOUNTER — LAB REQUISITION (OUTPATIENT)
Dept: LAB | Facility: CLINIC | Age: 74
End: 2024-05-30
Payer: MEDICARE

## 2024-05-30 DIAGNOSIS — I48.0 PAROXYSMAL ATRIAL FIBRILLATION (H): ICD-10-CM

## 2024-05-31 LAB — INR PPP: 3.03 (ref 0.85–1.15)

## 2024-05-31 PROCEDURE — P9604 ONE-WAY ALLOW PRORATED TRIP: HCPCS | Performed by: FAMILY MEDICINE

## 2024-05-31 PROCEDURE — 85610 PROTHROMBIN TIME: CPT | Performed by: FAMILY MEDICINE

## 2024-05-31 PROCEDURE — 36415 COLL VENOUS BLD VENIPUNCTURE: CPT | Performed by: FAMILY MEDICINE

## 2024-06-01 ENCOUNTER — HEALTH MAINTENANCE LETTER (OUTPATIENT)
Age: 74
End: 2024-06-01

## 2024-06-06 ENCOUNTER — LAB REQUISITION (OUTPATIENT)
Dept: LAB | Facility: CLINIC | Age: 74
End: 2024-06-06
Payer: MEDICARE

## 2024-06-06 DIAGNOSIS — I48.0 PAROXYSMAL ATRIAL FIBRILLATION (H): ICD-10-CM

## 2024-10-01 ENCOUNTER — LAB REQUISITION (OUTPATIENT)
Dept: LAB | Facility: CLINIC | Age: 74
End: 2024-10-01
Payer: MEDICARE

## 2024-10-01 ENCOUNTER — DOCUMENTATION ONLY (OUTPATIENT)
Dept: GERIATRICS | Facility: CLINIC | Age: 74
End: 2024-10-01
Payer: MEDICARE

## 2024-10-01 DIAGNOSIS — I48.0 PAROXYSMAL ATRIAL FIBRILLATION (H): ICD-10-CM

## 2024-10-01 PROBLEM — E11.628 DIABETIC INFECTION OF RIGHT FOOT (H): Status: ACTIVE | Noted: 2024-04-25

## 2024-10-01 PROBLEM — B35.6 TINEA CRURIS: Status: ACTIVE | Noted: 2024-04-27

## 2024-10-01 PROBLEM — M86.9 PYOGENIC INFLAMMATION OF BONE (H): Status: ACTIVE | Noted: 2024-04-25

## 2024-10-01 PROBLEM — S88.119A: Status: ACTIVE | Noted: 2024-09-20

## 2024-10-01 PROBLEM — Z87.81 HISTORY OF VERTEBRAL FRACTURE: Status: ACTIVE | Noted: 2022-11-28

## 2024-10-01 PROBLEM — L08.9 DIABETIC INFECTION OF RIGHT FOOT (H): Status: ACTIVE | Noted: 2024-04-25

## 2024-10-01 PROBLEM — I73.9 PERIPHERAL VASCULAR DISEASE, UNSPECIFIED (H): Status: ACTIVE | Noted: 2023-08-07

## 2024-10-01 PROBLEM — I87.2 VENOUS INSUFFICIENCY: Status: ACTIVE | Noted: 2023-08-22

## 2024-10-01 PROBLEM — Z89.431 S/P TRANSMETATARSAL AMPUTATION OF FOOT, RIGHT (H): Status: ACTIVE | Noted: 2024-05-04

## 2024-10-01 PROBLEM — M25.561 ACUTE PAIN OF RIGHT KNEE: Status: ACTIVE | Noted: 2023-09-30

## 2024-10-01 PROBLEM — S80.01XA HEMATOMA OF RIGHT KNEE REGION: Status: ACTIVE | Noted: 2023-10-02

## 2024-10-01 PROBLEM — Z79.01 ANTICOAGULATION MONITORING, INR RANGE 2-3: Status: ACTIVE | Noted: 2024-05-10

## 2024-10-02 ENCOUNTER — DOCUMENTATION ONLY (OUTPATIENT)
Dept: ANTICOAGULATION | Facility: CLINIC | Age: 74
End: 2024-10-02

## 2024-10-02 ENCOUNTER — TRANSITIONAL CARE UNIT VISIT (OUTPATIENT)
Dept: GERIATRICS | Facility: CLINIC | Age: 74
End: 2024-10-02
Payer: MEDICARE

## 2024-10-02 ENCOUNTER — ANTICOAGULATION THERAPY VISIT (OUTPATIENT)
Dept: ANTICOAGULATION | Facility: CLINIC | Age: 74
End: 2024-10-02

## 2024-10-02 VITALS
HEART RATE: 68 BPM | BODY MASS INDEX: 44.1 KG/M2 | RESPIRATION RATE: 18 BRPM | OXYGEN SATURATION: 99 % | TEMPERATURE: 97 F | SYSTOLIC BLOOD PRESSURE: 129 MMHG | HEIGHT: 71 IN | DIASTOLIC BLOOD PRESSURE: 71 MMHG | WEIGHT: 315 LBS

## 2024-10-02 DIAGNOSIS — Z89.511 S/P BKA (BELOW KNEE AMPUTATION), RIGHT (H): Primary | ICD-10-CM

## 2024-10-02 DIAGNOSIS — E11.610 DIABETIC CHARCOT FOOT (H): ICD-10-CM

## 2024-10-02 DIAGNOSIS — Z89.512 HX OF BELOW KNEE AMPUTATION, LEFT (H): ICD-10-CM

## 2024-10-02 DIAGNOSIS — I73.9 PVD (PERIPHERAL VASCULAR DISEASE) (H): ICD-10-CM

## 2024-10-02 DIAGNOSIS — E78.5 HYPERLIPIDEMIA LDL GOAL <130: ICD-10-CM

## 2024-10-02 DIAGNOSIS — Z79.4 TYPE 2 DIABETES MELLITUS WITH HYPERGLYCEMIA, WITH LONG-TERM CURRENT USE OF INSULIN (H): ICD-10-CM

## 2024-10-02 DIAGNOSIS — I48.0 PAROXYSMAL ATRIAL FIBRILLATION (H): ICD-10-CM

## 2024-10-02 DIAGNOSIS — I10 PRIMARY HYPERTENSION: ICD-10-CM

## 2024-10-02 DIAGNOSIS — E11.65 TYPE 2 DIABETES MELLITUS WITH HYPERGLYCEMIA, WITH LONG-TERM CURRENT USE OF INSULIN (H): ICD-10-CM

## 2024-10-02 DIAGNOSIS — I48.0 PAROXYSMAL ATRIAL FIBRILLATION (H): Primary | ICD-10-CM

## 2024-10-02 DIAGNOSIS — G60.9 IDIOPATHIC PERIPHERAL NEUROPATHY: ICD-10-CM

## 2024-10-02 DIAGNOSIS — J41.8 MIXED SIMPLE AND MUCOPURULENT CHRONIC BRONCHITIS (H): ICD-10-CM

## 2024-10-02 DIAGNOSIS — K21.00 GASTROESOPHAGEAL REFLUX DISEASE WITH ESOPHAGITIS WITHOUT HEMORRHAGE: ICD-10-CM

## 2024-10-02 LAB
ANION GAP SERPL CALCULATED.3IONS-SCNC: 6 MMOL/L (ref 7–15)
BUN SERPL-MCNC: 17 MG/DL (ref 8–23)
CALCIUM SERPL-MCNC: 9.9 MG/DL (ref 8.8–10.4)
CHLORIDE SERPL-SCNC: 105 MMOL/L (ref 98–107)
CREAT SERPL-MCNC: 1.04 MG/DL (ref 0.67–1.17)
EGFRCR SERPLBLD CKD-EPI 2021: 75 ML/MIN/1.73M2
ERYTHROCYTE [DISTWIDTH] IN BLOOD BY AUTOMATED COUNT: 16.6 % (ref 10–15)
GLUCOSE SERPL-MCNC: 101 MG/DL (ref 70–99)
HCO3 SERPL-SCNC: 27 MMOL/L (ref 22–29)
HCT VFR BLD AUTO: 28.4 % (ref 40–53)
HGB BLD-MCNC: 8.8 G/DL (ref 13.3–17.7)
INR (EXTERNAL): 1.1
INR (EXTERNAL): 1.2
INR (EXTERNAL): 1.2
INR (EXTERNAL): 1.6
INR (EXTERNAL): 2.1
INR (EXTERNAL): 2.2
INR (EXTERNAL): 2.2
INR (EXTERNAL): 2.4
INR (EXTERNAL): 2.9
INR (EXTERNAL): 3.1
MCH RBC QN AUTO: 27.2 PG (ref 26.5–33)
MCHC RBC AUTO-ENTMCNC: 31 G/DL (ref 31.5–36.5)
MCV RBC AUTO: 88 FL (ref 78–100)
PLATELET # BLD AUTO: 270 10E3/UL (ref 150–450)
POTASSIUM SERPL-SCNC: 4.4 MMOL/L (ref 3.4–5.3)
RBC # BLD AUTO: 3.23 10E6/UL (ref 4.4–5.9)
SODIUM SERPL-SCNC: 138 MMOL/L (ref 135–145)
WBC # BLD AUTO: 4.8 10E3/UL (ref 4–11)

## 2024-10-02 PROCEDURE — 80048 BASIC METABOLIC PNL TOTAL CA: CPT | Performed by: NURSE PRACTITIONER

## 2024-10-02 PROCEDURE — 36415 COLL VENOUS BLD VENIPUNCTURE: CPT | Performed by: NURSE PRACTITIONER

## 2024-10-02 PROCEDURE — 85027 COMPLETE CBC AUTOMATED: CPT | Performed by: NURSE PRACTITIONER

## 2024-10-02 PROCEDURE — 99310 SBSQ NF CARE HIGH MDM 45: CPT | Performed by: NURSE PRACTITIONER

## 2024-10-02 PROCEDURE — P9604 ONE-WAY ALLOW PRORATED TRIP: HCPCS | Performed by: NURSE PRACTITIONER

## 2024-10-02 RX ORDER — METHOTREXATE SODIUM 2.5 MG/1
2.5 TABLET ORAL WEEKLY
COMMUNITY
Start: 2024-10-02

## 2024-10-02 RX ORDER — METHOCARBAMOL 750 MG/1
750 TABLET, FILM COATED ORAL 4 TIMES DAILY
COMMUNITY
Start: 2024-10-02

## 2024-10-02 NOTE — PROGRESS NOTES
Patient is a new referral from [facility].    Referral sent to Tram Guy for cosignature.    Please see 10/2/2024 Anticoagulation Therapy encounter for further information    Sulema Cohen [facility]    [phone]

## 2024-10-02 NOTE — PROGRESS NOTES
[facility] GERIATRIC SERVICES    Code Status:  FULL CODE   Visit Type:   Chief Complaint   Patient presents with    TCU Admission     [facility] 9/20/2024 - 10/1/2024     Facility:  [facility] ([facility]) [[id number]]         HPI: Oliver Lawson is a 73 year old male who I am seeing today for admit to the TCU. Past medical history includes uncontrolled DM2 on insulin, peripheral vascular disease, BKA left (2018) and right transmetatarsal amputation, charcot foot, COPD, paroxysmal atrial fibrillation s/p PPM, RLE lymphedema, rheumatoid arthritis and CATALINA on CPAP. Pt underwent elective Right BKA on 9/17/24 due to chronic DM non healing foot ulcers with charcot. Pt was stabilized acutely and was admitted to [facility] for inpatient rehabilitation on 9/20/24.     Transitional Care Course: Today pt sitting up in wheelchair. He is known to me from a previous TCU stay. He has stump  to right stump. He denies any pain due to peripheral neuropathy. He has hx of left BKA. He has prothesis. Pt denies any SOB or CP. He is having regular bowel movements every other day. He is emptying his bladder. Underlying DM2. His blood sugars are low after lunch. Today initially 64. He did eat a snack and drink juice and blood sugar up to 75. His insulin regimen reviewed from admit. He is receiving Lantus 15 units in am and 35 units in evening. He is also receiving 14 units at breakfast and 12 units at lunch plus sliding scale. Discussed reducing lunch to 6 units. Holding the 6 units if blood sugar less than 120. Overall review of blood sugars 111-198. Pt continues on chronic AC for A fib.       Assessment/Plan:       S/P R BKA 9/17/2024   Hx S/P below knee amputation, left.  Charcot foot due to diabetes mellitus.   - WBAT  - Incisional site care per vascular surgery.   - Brace:  & RRD. No knee contracture.   - Arrangements made for eventual prosthetic fitting. Continue , RRD. Follow up with Vascular on  10/18  -Follow up CBC.     Paroxysmal atrial fibrillation  Complete heart block s/p pacemaker.   - S/p PPM for history of 3rd degree heart block.   -Continue Warfarin.   -INR 2.1.   -Coumadin clinic to dose Warfarin.   -Continue Metoprolol 50 mg daily      Diabetes Mellitus Type 2   Peripheral Neuropathy  - A1c 8.4% (4/17/2024)  -Consistent carb diet  -QID blood sugar checks  -Lantus 15 units in am and 35 units in pm.        -PTA on 55 U glargine QHS + aspart 16/14/13u + SSI and 1000 mg metformin BID.   -Metformin 500 mg bid.   -Aspart 14 units in am, change lunch to 6 units + sliding scale. (Pt have drops after lunch).   -Add 2 am blood sugar checks X 5 days.     Peripheral vascular disease  Hyperlipidemia  -BLE arterial US in 4/2024 with diffuse arterial calcifications in RLE but no evidence of significant stenoses or occlusions in LLE. PTA on atorvastatin 80 mg daily.   -Atorvastatin 80 mg at bedtime      Hypertension  -Losartan 50 mg daily   -Metoprolol 50 mg daily      COPD (chronic obstructive pulmonary disease)  -Continue to monitor   -no evidence of acute exacerbation.      Rheumatoid Arthritis.   -Follows with rheumatology.  -folic acid 1 mg daily   -Methotrexate 12.5 mg every sunday    CKD  -Baseline Cr ~1.0-1.1  -Follow up BMP.      GERD   -Famotidine 20 mg BID    -OK for PT, OT to eval and treat.      Active Ambulatory Problems     Diagnosis Date Noted    Traumatic hematoma 07/15/2021    ACP (advance care planning) 11/07/2005    Type 2 diabetes mellitus with hyperglycemia (H) 07/19/2021    Renal insufficiency syndrome 01/10/2011    COPD (chronic obstructive pulmonary disease) (H) 12/29/2014    Obesity, morbid (more than 100 lbs over ideal weight or BMI > 40) (H) 12/17/2010    Paroxysmal atrial fibrillation (H) 05/03/2016    Abscess of plantar aspect of foot 08/03/2021    Alcohol dependence (H) 12/13/2010    AV block, 3rd degree (H) 02/07/2016    Bilateral hearing loss 04/25/2016    BPH (benign prostatic  hyperplasia) 07/21/2017    Charcot foot due to diabetes mellitus (H) 08/03/2021    Diabetes mellitus without complication (H) 02/07/2005    Fall 07/12/2021    Fall with injury 07/13/2021    GERD (gastroesophageal reflux disease) 03/12/2013    Essential hypertension 12/13/2010    Low vision, both eyes 02/17/2016    Dysthymic disorder 08/03/2021    Hyperlipidemia 02/07/2005    TGA (transient global amnesia) 11/16/2014    Surgical aftercare, musculoskeletal system 02/28/2019    Superficial bruising 07/12/2021    Spiral fracture of shaft of femur (H) 12/06/2018    Sleep apnea 08/03/2021    S/P placement of cardiac pacemaker 08/29/2016    S/P below knee amputation, left (H) 07/03/2018    Rheumatoid arthritis (H) 01/04/2007    Pre-ulcerative calluses 04/14/2014    Other diseases of lung, not elsewhere classified 11/14/2005    Osteomyelitis of right foot (H) 08/03/2021    Nonspecific abnormal electrocardiogram (ECG) (EKG) 08/03/2021    MRSA (methicillin resistant Staphylococcus aureus) infection 11/23/2012    Lower limb amputation, great toe (H) 03/02/2011    Morbid obesity with BMI of 40.0-44.9, adult (H) 12/07/2018    A-fib (H) 05/06/2022    Umbilical hernia 01/03/2022    Osteoarthritis of right knee 05/03/2022    Acute pain of right knee 09/30/2023    Anticoagulation monitoring, INR range 2-3 05/10/2024    Below-knee amputation, initial encounter (H) 09/20/2024    Cardiac pacemaker 08/29/2016    Hematoma of right knee region 10/02/2023    History of vertebral fracture 11/28/2022    Pulmonary nodules 11/14/2005    Pyogenic inflammation of bone (H) 04/25/2024    S/P transmetatarsal amputation of foot, right (H) 05/04/2024    Tinea cruris 04/27/2024    Venous insufficiency 08/22/2023    Diabetic infection of right foot (H) 04/25/2024    Peripheral vascular disease, unspecified (H) 08/07/2023    Seropositive rheumatoid arthritis (H) 05/24/2013    Type 1 diabetes mellitus (H) 05/24/2013     Resolved Ambulatory Problems      Diagnosis Date Noted    No Resolved Ambulatory Problems     Past Medical History:   Diagnosis Date    Dyspnea on exertion     Gastro-oesophageal reflux disease     Hypertension     Methicillin resistant Staphylococcus aureus in conditions classified elsewhere and of unspecified site     Morbid obesity (H)     Numbness and tingling     Open wound of foot except toe(s) alone, complicated     Other and unspecified alcohol dependence, unspecified drinking behavior     Other and unspecified hyperlipidemia     Renal insufficiency     Rheumatoid arthritis (H)     Shortness of breath     Type II or unspecified type diabetes mellitus without mention of complication, not stated as uncontrolled     Unspecified pleural effusion      Allergies   Allergen Reactions    Ace Inhibitors Cough    Angiotensin Receptor Blockers Other (See Comments)     ARB-Angiotensin Receptor Antagonist    Contrast Dye Unknown    Diatrizoate Other (See Comments)       All Meds and Allergies reviewed in the record at the facility and is the most up-to-date.    Post Discharge Medication Reconciliation Status: discharge medications reconciled and changed, per note/orders  Current Outpatient Medications   Medication Sig Dispense Refill    acetaminophen (TYLENOL) 500 MG tablet Take 1,000 mg by mouth 3 times daily.      atorvastatin (LIPITOR) 80 MG tablet Take 80 mg by mouth daily.      famotidine (PEPCID) 20 MG tablet Take 20 mg by mouth 2 times daily      folic acid (FOLVITE) 1 MG tablet Take 1 mg by mouth daily      insulin aspart (NOVOLOG PEN) 100 UNIT/ML pen Inject subcutaneously 3 times daily (with meals). 14 units with breakfast; 6 units with lunch; AND sliding scale TID with meals    Per Sliding Scale:  If Blood Sugar is 150 to 199, give 2 Units.  If Blood Sugar is 200 to 249, give 4 Units.  If Blood Sugar is 250 to 299, give 6 Units.  If Blood Sugar is 300 to 349, give 8 Units.  If Blood Sugar is 350 to 399, give 10 Units.  If Blood Sugar is 400 to  "800, give 12 Units.  If Blood Sugar is greater than 800, call MD.      insulin glargine (LANTUS) SOLN 100 UNIT/ML Inject subcutaneously 2 times daily. 15 units every morning AND 35 units every evening at bedtime      losartan (COZAAR) 50 MG tablet Take 50 mg by mouth daily      metFORMIN (GLUCOPHAGE) 500 MG tablet Take 500 mg by mouth 2 times daily (with meals).      methocarbamol (ROBAXIN) 750 MG tablet Take 750 mg by mouth 4 times daily.      methotrexate 2.5 MG tablet Take 2.5 mg by mouth once a week.      metoprolol succinate ER (TOPROL XL) 50 MG 24 hr tablet Take 50 mg by mouth daily.      multivitamin w/minerals (THERA-VIT-M) tablet Take 1 tablet by mouth daily      sertraline (ZOLOFT) 50 MG tablet Take 150 mg by mouth every morning.      warfarin ANTICOAGULANT (COUMADIN) 7.5 MG tablet Take 7.5 mg by mouth daily Patient takes 7.5 mg on Thursday, Saturday, Sunday and 5 mg on Monday, Wednesday.  Follow-up INR on 8/9/2021.       No current facility-administered medications for this visit.       REVIEW OF SYSTEMS:   10 point review of systems reviewed and pertinent positives in the HPI.     PHYSICAL EXAMINATION:  Physical Exam     Vital signs: /71   Pulse 68   Temp 97  F (36.1  C)   Resp 18   Ht 1.803 m (5' 11\")   Wt (!) 152.9 kg (337 lb)   SpO2 99%   BMI 47.00 kg/m    General: Awake, Alert, oriented x3, siting up in wheelchair, appropriately, follows simple commands, conversant  HEENT:Pink conjunctiva, moist oral mucosa  NECK: Supple  CVS:  S1  S2, without murmur or gallop.   LUNG: Clear to auscultation, No wheezes, rales or rhonci.  BACK: No kyphosis of the thoracic spine  ABDOMEN: Soft, obese, non tender to palpation, with positive bowel sounds  EXTREMITIES: Moves both upper and lower extremities. Right BKA with stump  on. Left BKA with prothesis on.   SKIN: Warm and dry  NEUROLOGIC: Intact, pulses palpable  PSYCHIATRIC: Cognition intact      Labs:  All labs reviewed in the nursing home " record and Epic   @  Lab Results   Component Value Date    WBC 4.8 10/02/2024     Lab Results   Component Value Date    RBC 3.23 10/02/2024     Lab Results   Component Value Date    HGB 8.8 10/02/2024     Lab Results   Component Value Date    HCT 28.4 10/02/2024     Lab Results   Component Value Date    MCV 88 10/02/2024     Lab Results   Component Value Date    MCH 27.2 10/02/2024     Lab Results   Component Value Date    MCHC 31.0 10/02/2024     Lab Results   Component Value Date    RDW 16.6 10/02/2024     Lab Results   Component Value Date     10/02/2024        @Last Comprehensive Metabolic Panel:  Sodium   Date Value Ref Range Status   10/02/2024 138 135 - 145 mmol/L Final     Potassium   Date Value Ref Range Status   10/02/2024 4.4 3.4 - 5.3 mmol/L Final   05/10/2022 4.5 3.5 - 5.0 mmol/L Final     Chloride   Date Value Ref Range Status   10/02/2024 105 98 - 107 mmol/L Final   05/10/2022 101 98 - 107 mmol/L Final     Carbon Dioxide (CO2)   Date Value Ref Range Status   10/02/2024 27 22 - 29 mmol/L Final   05/10/2022 26 22 - 31 mmol/L Final     Anion Gap   Date Value Ref Range Status   10/02/2024 6 (L) 7 - 15 mmol/L Final   05/10/2022 10 5 - 18 mmol/L Final     Glucose   Date Value Ref Range Status   10/02/2024 101 (H) 70 - 99 mg/dL Final   05/10/2022 263 (H) 70 - 125 mg/dL Final     Urea Nitrogen   Date Value Ref Range Status   10/02/2024 17.0 8.0 - 23.0 mg/dL Final   05/10/2022 14 8 - 28 mg/dL Final     Creatinine   Date Value Ref Range Status   10/02/2024 1.04 0.67 - 1.17 mg/dL Final     GFR Estimate   Date Value Ref Range Status   10/02/2024 75 >60 mL/min/1.73m2 Final     Calcium   Date Value Ref Range Status   10/02/2024 9.9 8.8 - 10.4 mg/dL Final     Comment:     Reference intervals for this test were updated on 7/16/2024 to reflect our healthy population more accurately. There may be differences in the flagging of prior results with similar values performed with this method. Those prior results can  be interpreted in the context of the updated reference intervals.     > 45 minutes spent preparing for this visit, reviewing hospital records, labs, consults, meds, imaging as well as face to face time spent with pt and collaborating with nursing staff.       This note has been dictated using voice recognition software. Any grammatical or context distortions are unintentional and inherent to the software    Electronically signed by: Tram Guy CNP

## 2024-10-02 NOTE — LETTER
10/2/2024      Oliver Lawson  [address] [facility] [address]        [facility]    Code Status:  FULL CODE   Visit Type:   Chief Complaint   Patient presents with     TCU Admission     [facility] 9/20/2024 - 10/1/2024     Facility:  [facility] ([facility]) [[id number]]         HPI: Oliver Lawson is a 73 year old male who I am seeing today for admit to the TCU. Past medical history includes uncontrolled DM2 on insulin, peripheral vascular disease, BKA left (2018) and right transmetatarsal amputation, charcot foot, COPD, paroxysmal atrial fibrillation s/p PPM, RLE lymphedema, rheumatoid arthritis and CATALINA on CPAP. Pt underwent elective Right BKA on 9/17/24 due to chronic DM non healing foot ulcers with charcot. Pt was stabilized acutely and was admitted to [facility] for inpatient rehabilitation on 9/20/24.     Transitional Care Course: Today pt sitting up in wheelchair. He is known to me from a previous TCU stay. He has stump  to right stump. He denies any pain due to peripheral neuropathy. He has hx of left BKA. He has prothesis. Pt denies any SOB or CP. He is having regular bowel movements every other day. He is emptying his bladder. Underlying DM2. His blood sugars are low after lunch. Today initially 64. He did eat a snack and drink juice and blood sugar up to 75. His insulin regimen reviewed from admit. He is receiving Lantus 15 units in am and 35 units in evening. He is also receiving 14 units at breakfast and 12 units at lunch plus sliding scale. Discussed reducing lunch to 6 units. Holding the 6 units if blood sugar less than 120. Overall review of blood sugars 111-198. Pt continues on chronic AC for A fib.       Assessment/Plan:       S/P R BKA 9/17/2024   Hx S/P below knee amputation, left.  Charcot foot due to diabetes mellitus.   - WBAT  - Incisional site care per vascular surgery.   - Brace:  & RRD. No knee contracture.   - Arrangements made  for eventual prosthetic fitting. Continue , RRD. Follow up with Vascular on 10/18  -Follow up CBC.     Paroxysmal atrial fibrillation  Complete heart block s/p pacemaker.   - S/p PPM for history of 3rd degree heart block.   -Continue Warfarin.   -INR 2.1.   -Coumadin clinic to dose Warfarin.   -Continue Metoprolol 50 mg daily      Diabetes Mellitus Type 2   Peripheral Neuropathy  - A1c 8.4% (4/17/2024)  -Consistent carb diet  -QID blood sugar checks  -Lantus 15 units in am and 35 units in pm.        -PTA on 55 U glargine QHS + aspart 16/14/13u + SSI and 1000 mg metformin BID.   -Metformin 500 mg bid.   -Aspart 14 units in am, change lunch to 6 units + sliding scale. (Pt have drops after lunch).   -Add 2 am blood sugar checks X 5 days.     Peripheral vascular disease  Hyperlipidemia  -BLE arterial US in 4/2024 with diffuse arterial calcifications in RLE but no evidence of significant stenoses or occlusions in LLE. PTA on atorvastatin 80 mg daily.   -Atorvastatin 80 mg at bedtime      Hypertension  -Losartan 50 mg daily   -Metoprolol 50 mg daily      COPD (chronic obstructive pulmonary disease)  -Continue to monitor   -no evidence of acute exacerbation.      Rheumatoid Arthritis.   -Follows with rheumatology.  -folic acid 1 mg daily   -Methotrexate 12.5 mg every sunday    CKD  -Baseline Cr ~1.0-1.1  -Follow up BMP.      GERD   -Famotidine 20 mg BID    -OK for PT, OT to eval and treat.      Active Ambulatory Problems     Diagnosis Date Noted     Traumatic hematoma 07/15/2021     ACP (advance care planning) 11/07/2005     Type 2 diabetes mellitus with hyperglycemia (H) 07/19/2021     Renal insufficiency syndrome 01/10/2011     COPD (chronic obstructive pulmonary disease) (H) 12/29/2014     Obesity, morbid (more than 100 lbs over ideal weight or BMI > 40) (H) 12/17/2010     Paroxysmal atrial fibrillation (H) 05/03/2016     Abscess of plantar aspect of foot 08/03/2021     Alcohol dependence (H) 12/13/2010     AV  block, 3rd degree (H) 02/07/2016     Bilateral hearing loss 04/25/2016     BPH (benign prostatic hyperplasia) 07/21/2017     Charcot foot due to diabetes mellitus (H) 08/03/2021     Diabetes mellitus without complication (H) 02/07/2005     Fall 07/12/2021     Fall with injury 07/13/2021     GERD (gastroesophageal reflux disease) 03/12/2013     Essential hypertension 12/13/2010     Low vision, both eyes 02/17/2016     Dysthymic disorder 08/03/2021     Hyperlipidemia 02/07/2005     TGA (transient global amnesia) 11/16/2014     Surgical aftercare, musculoskeletal system 02/28/2019     Superficial bruising 07/12/2021     Spiral fracture of shaft of femur (H) 12/06/2018     Sleep apnea 08/03/2021     S/P placement of cardiac pacemaker 08/29/2016     S/P below knee amputation, left (H) 07/03/2018     Rheumatoid arthritis (H) 01/04/2007     Pre-ulcerative calluses 04/14/2014     Other diseases of lung, not elsewhere classified 11/14/2005     Osteomyelitis of right foot (H) 08/03/2021     Nonspecific abnormal electrocardiogram (ECG) (EKG) 08/03/2021     MRSA (methicillin resistant Staphylococcus aureus) infection 11/23/2012     Lower limb amputation, great toe (H) 03/02/2011     Morbid obesity with BMI of 40.0-44.9, adult (H) 12/07/2018     A-fib (H) 05/06/2022     Umbilical hernia 01/03/2022     Osteoarthritis of right knee 05/03/2022     Acute pain of right knee 09/30/2023     Anticoagulation monitoring, INR range 2-3 05/10/2024     Below-knee amputation, initial encounter (H) 09/20/2024     Cardiac pacemaker 08/29/2016     Hematoma of right knee region 10/02/2023     History of vertebral fracture 11/28/2022     Pulmonary nodules 11/14/2005     Pyogenic inflammation of bone (H) 04/25/2024     S/P transmetatarsal amputation of foot, right (H) 05/04/2024     Tinea cruris 04/27/2024     Venous insufficiency 08/22/2023     Diabetic infection of right foot (H) 04/25/2024     Peripheral vascular disease, unspecified (H)  08/07/2023     Seropositive rheumatoid arthritis (H) 05/24/2013     Type 1 diabetes mellitus (H) 05/24/2013     Resolved Ambulatory Problems     Diagnosis Date Noted     No Resolved Ambulatory Problems     Past Medical History:   Diagnosis Date     Dyspnea on exertion      Gastro-oesophageal reflux disease      Hypertension      Methicillin resistant Staphylococcus aureus in conditions classified elsewhere and of unspecified site      Morbid obesity (H)      Numbness and tingling      Open wound of foot except toe(s) alone, complicated      Other and unspecified alcohol dependence, unspecified drinking behavior      Other and unspecified hyperlipidemia      Renal insufficiency      Rheumatoid arthritis (H)      Shortness of breath      Type II or unspecified type diabetes mellitus without mention of complication, not stated as uncontrolled      Unspecified pleural effusion      Allergies   Allergen Reactions     Ace Inhibitors Cough     Angiotensin Receptor Blockers Other (See Comments)     ARB-Angiotensin Receptor Antagonist     Contrast Dye Unknown     Diatrizoate Other (See Comments)       All Meds and Allergies reviewed in the record at the facility and is the most up-to-date.    Post Discharge Medication Reconciliation Status: discharge medications reconciled and changed, per note/orders  Current Outpatient Medications   Medication Sig Dispense Refill     acetaminophen (TYLENOL) 500 MG tablet Take 1,000 mg by mouth 3 times daily.       atorvastatin (LIPITOR) 80 MG tablet Take 80 mg by mouth daily.       famotidine (PEPCID) 20 MG tablet Take 20 mg by mouth 2 times daily       folic acid (FOLVITE) 1 MG tablet Take 1 mg by mouth daily       insulin aspart (NOVOLOG PEN) 100 UNIT/ML pen Inject subcutaneously 3 times daily (with meals). 14 units with breakfast; 6 units with lunch; AND sliding scale TID with meals    Per Sliding Scale:  If Blood Sugar is 150 to 199, give 2 Units.  If Blood Sugar is 200 to 249, give 4  "Units.  If Blood Sugar is 250 to 299, give 6 Units.  If Blood Sugar is 300 to 349, give 8 Units.  If Blood Sugar is 350 to 399, give 10 Units.  If Blood Sugar is 400 to 800, give 12 Units.  If Blood Sugar is greater than 800, call MD.       insulin glargine (LANTUS) SOLN 100 UNIT/ML Inject subcutaneously 2 times daily. 15 units every morning AND 35 units every evening at bedtime       losartan (COZAAR) 50 MG tablet Take 50 mg by mouth daily       metFORMIN (GLUCOPHAGE) 500 MG tablet Take 500 mg by mouth 2 times daily (with meals).       methocarbamol (ROBAXIN) 750 MG tablet Take 750 mg by mouth 4 times daily.       methotrexate 2.5 MG tablet Take 2.5 mg by mouth once a week.       metoprolol succinate ER (TOPROL XL) 50 MG 24 hr tablet Take 50 mg by mouth daily.       multivitamin w/minerals (THERA-VIT-M) tablet Take 1 tablet by mouth daily       sertraline (ZOLOFT) 50 MG tablet Take 150 mg by mouth every morning.       warfarin ANTICOAGULANT (COUMADIN) 7.5 MG tablet Take 7.5 mg by mouth daily Patient takes 7.5 mg on Thursday, Saturday, Sunday and 5 mg on Monday, Wednesday.  Follow-up INR on 8/9/2021.       No current facility-administered medications for this visit.       REVIEW OF SYSTEMS:   10 point review of systems reviewed and pertinent positives in the HPI.     PHYSICAL EXAMINATION:  Physical Exam     Vital signs: /71   Pulse 68   Temp 97  F (36.1  C)   Resp 18   Ht 1.803 m (5' 11\")   Wt (!) 152.9 kg (337 lb)   SpO2 99%   BMI 47.00 kg/m    General: Awake, Alert, oriented x3, siting up in wheelchair, appropriately, follows simple commands, conversant  HEENT:Pink conjunctiva, moist oral mucosa  NECK: Supple  CVS:  S1  S2, without murmur or gallop.   LUNG: Clear to auscultation, No wheezes, rales or rhonci.  BACK: No kyphosis of the thoracic spine  ABDOMEN: Soft, obese, non tender to palpation, with positive bowel sounds  EXTREMITIES: Moves both upper and lower extremities. Right BKA with stump "  on. Left BKA with prothesis on.   SKIN: Warm and dry  NEUROLOGIC: Intact, pulses palpable  PSYCHIATRIC: Cognition intact      Labs:  All labs reviewed in the nursing home record and Epic   @  Lab Results   Component Value Date    WBC 4.8 10/02/2024     Lab Results   Component Value Date    RBC 3.23 10/02/2024     Lab Results   Component Value Date    HGB 8.8 10/02/2024     Lab Results   Component Value Date    HCT 28.4 10/02/2024     Lab Results   Component Value Date    MCV 88 10/02/2024     Lab Results   Component Value Date    MCH 27.2 10/02/2024     Lab Results   Component Value Date    MCHC 31.0 10/02/2024     Lab Results   Component Value Date    RDW 16.6 10/02/2024     Lab Results   Component Value Date     10/02/2024        @Last Comprehensive Metabolic Panel:  Sodium   Date Value Ref Range Status   10/02/2024 138 135 - 145 mmol/L Final     Potassium   Date Value Ref Range Status   10/02/2024 4.4 3.4 - 5.3 mmol/L Final   05/10/2022 4.5 3.5 - 5.0 mmol/L Final     Chloride   Date Value Ref Range Status   10/02/2024 105 98 - 107 mmol/L Final   05/10/2022 101 98 - 107 mmol/L Final     Carbon Dioxide (CO2)   Date Value Ref Range Status   10/02/2024 27 22 - 29 mmol/L Final   05/10/2022 26 22 - 31 mmol/L Final     Anion Gap   Date Value Ref Range Status   10/02/2024 6 (L) 7 - 15 mmol/L Final   05/10/2022 10 5 - 18 mmol/L Final     Glucose   Date Value Ref Range Status   10/02/2024 101 (H) 70 - 99 mg/dL Final   05/10/2022 263 (H) 70 - 125 mg/dL Final     Urea Nitrogen   Date Value Ref Range Status   10/02/2024 17.0 8.0 - 23.0 mg/dL Final   05/10/2022 14 8 - 28 mg/dL Final     Creatinine   Date Value Ref Range Status   10/02/2024 1.04 0.67 - 1.17 mg/dL Final     GFR Estimate   Date Value Ref Range Status   10/02/2024 75 >60 mL/min/1.73m2 Final     Calcium   Date Value Ref Range Status   10/02/2024 9.9 8.8 - 10.4 mg/dL Final     Comment:     Reference intervals for this test were updated on 7/16/2024  to reflect our healthy population more accurately. There may be differences in the flagging of prior results with similar values performed with this method. Those prior results can be interpreted in the context of the updated reference intervals.     > 45 minutes spent preparing for this visit, reviewing hospital records, labs, consults, meds, imaging as well as face to face time spent with pt and collaborating with nursing staff.       This note has been dictated using voice recognition software. Any grammatical or context distortions are unintentional and inherent to the software    Electronically signed by: Tram Guy CNP       Sincerely,        Tram Guy NP

## 2024-10-02 NOTE — PROGRESS NOTES
ANTICOAGULATION MANAGEMENT     Oliver Lawson 73 year old male is on warfarin with therapeutic INR result. (Goal INR 2.0-3.0)    Recent labs: (last 7 days)     10/02/24  0000   INR 2.1       ASSESSMENT     Source(s): Chart Review and Home Care/Facility Nurse     Warfarin doses taken: While hospitalized on 9/20-10/1: anticoagulation calendar updated with inpatient dosing.  Discharged on: 7.5 mg on 10/1 then INR recheck today  Diet: No new diet changes identified  Medication/supplement changes: None noted  New illness, injury, or hospitalization: Yes: admitted on 9/20-10/1 for Below knee amputation caused by Charcot foot, vascular disease, uncontrolled diabetes in context of previous left BKA (2018) , Right BKA on 9/17/24  Signs or symptoms of bleeding or clotting: No  Previous result: Therapeutic last 2(+) visits  Additional findings:  Patient is a new referral from [facility], previously managed by [facility] anticoagulation clinic         PLAN     Recommended plan for temporary change(s) affecting INR     Dosing Instructions:  7.5 mg Monday, Wed, Friday then 5 mg all other days  with next INR in 5 days       Summary  As of 10/2/2024      Full warfarin instructions:  7.5 mg every Mon, Wed, Fri; 5 mg all other days   Next INR check:  10/7/2024               Telephone call with [facility] nurse (medical care for seniors) who verbalizes understanding and agrees to plan and who agrees to plan and repeated back plan correctly    Orders given to  Homecare nurse/facility to recheck    Education provided: Contact [phone] with any changes, questions or concerns.     Plan made with [facility] Pharmacist Ayesha Cohen RN  10/2/2024  Anticoagulation Clinic  Typeform for routing messages: p [facility] (TCU/LTC/TACHO)  [facility] patient phone line: [phone]        _______________________________________________________________________     Anticoagulation Episode Summary        Current INR goal:  2.0-3.0   TTR:  --   Target end date:  Indefinite   Send INR reminders to:  [facility] (TCU/LTC/TACHO)    Indications    Paroxysmal atrial fibrillation (H) [I48.0]             Comments:               Anticoagulation Care Providers       Provider Role Specialty Phone number    Tram Guy NP Referring Nurse Practitioner [phone]

## 2024-10-04 ENCOUNTER — TRANSITIONAL CARE UNIT VISIT (OUTPATIENT)
Dept: GERIATRICS | Facility: CLINIC | Age: 74
End: 2024-10-04
Payer: MEDICARE

## 2024-10-04 VITALS
DIASTOLIC BLOOD PRESSURE: 75 MMHG | RESPIRATION RATE: 22 BRPM | BODY MASS INDEX: 47 KG/M2 | WEIGHT: 315 LBS | TEMPERATURE: 96 F | SYSTOLIC BLOOD PRESSURE: 149 MMHG | HEART RATE: 92 BPM | OXYGEN SATURATION: 97 %

## 2024-10-04 DIAGNOSIS — Z53.9 ERRONEOUS ENCOUNTER--DISREGARD: Primary | ICD-10-CM

## 2024-10-04 NOTE — PROGRESS NOTES
[facility] GERIATRICS  ACUTE/EPISODIC VISIT    [facility] Medical Record Number:  [id number]  Place of Service where encounter took place:  [facility] [place] ([facility]) [[id number]]    Chief Complaint   Patient presents with    RECHECK       HPI:    Oliver Lawson is a 73 year old  (1950), who is being seen today for an episodic care visit.  HPI information obtained from: {FGS HPI:181504}.    Today's concern is:      ALLERGIES:    Allergies   Allergen Reactions    Ace Inhibitors Cough    Angiotensin Receptor Blockers Other (See Comments)     ARB-Angiotensin Receptor Antagonist    Contrast Dye Unknown    Diatrizoate Other (See Comments)        MEDICATIONS:  Post Discharge Medication Reconciliation Status: {ACO Med Rec (Provider):850680}. ***    Current Outpatient Medications   Medication Sig Dispense Refill    acetaminophen (TYLENOL) 500 MG tablet Take 1,000 mg by mouth 3 times daily.      atorvastatin (LIPITOR) 80 MG tablet Take 80 mg by mouth daily.      famotidine (PEPCID) 20 MG tablet Take 20 mg by mouth 2 times daily      folic acid (FOLVITE) 1 MG tablet Take 1 mg by mouth daily      insulin aspart (NOVOLOG PEN) 100 UNIT/ML pen Inject subcutaneously 3 times daily (with meals). 14 units with breakfast; 6 units with lunch; AND sliding scale TID with meals    Per Sliding Scale:  If Blood Sugar is 150 to 199, give 2 Units.  If Blood Sugar is 200 to 249, give 4 Units.  If Blood Sugar is 250 to 299, give 6 Units.  If Blood Sugar is 300 to 349, give 8 Units.  If Blood Sugar is 350 to 399, give 10 Units.  If Blood Sugar is 400 to 800, give 12 Units.  If Blood Sugar is greater than 800, call MD.      insulin glargine (LANTUS) SOLN 100 UNIT/ML Inject subcutaneously 2 times daily. 15 units every morning AND 35 units every evening at bedtime      losartan (COZAAR) 50 MG tablet Take 50 mg by mouth daily      metFORMIN (GLUCOPHAGE) 500 MG tablet Take 500 mg by mouth 2 times daily (with meals).       methocarbamol (ROBAXIN) 750 MG tablet Take 750 mg by mouth 4 times daily.      methotrexate 2.5 MG tablet Take 2.5 mg by mouth once a week.      metoprolol succinate ER (TOPROL XL) 50 MG 24 hr tablet Take 50 mg by mouth daily.      multivitamin w/minerals (THERA-VIT-M) tablet Take 1 tablet by mouth daily      sertraline (ZOLOFT) 50 MG tablet Take 150 mg by mouth every morning.      warfarin ANTICOAGULANT (COUMADIN) 7.5 MG tablet Take 7.5 mg by mouth daily Patient takes 7.5 mg on Thursday, Saturday, Sunday and 5 mg on Monday, Wednesday.  Follow-up INR on 8/9/2021.       Medications reviewed:  Medications reconciled to facility chart and changes were made to reflect current medications as identified as above med list. Below are the changes that were made:   Medications stopped since last EPIC medication reconciliation:   There are no discontinued medications.    Medications started since last [facility] medication reconciliation:  No orders of the defined types were placed in this encounter.    ***    REVIEW OF SYSTEMS:  4 point ROS neg other than the symptoms noted above in the HPI.***  Unable to be obtained due to cognitive impairment or aphasia.   ROS    PHYSICAL EXAM:  BP (!) 149/75   Pulse 92   Temp (!) 96  F (35.6  C)   Resp 22   Wt (!) 152.9 kg (337 lb)   SpO2 97%   BMI 47.00 kg/m    Physical Exam      ASSESSMENT / PLAN:  {FGS DX:112549}    Orders:  ***    Electronically signed by  Teressa Vela

## 2024-10-04 NOTE — LETTER
10/4/2024      Oliver Lawson  [address] [facility] [address]        No notes on file      Sincerely,        GANESH Rodrigez CNP

## 2024-10-07 ENCOUNTER — TRANSITIONAL CARE UNIT VISIT (OUTPATIENT)
Dept: GERIATRICS | Facility: CLINIC | Age: 74
End: 2024-10-07
Payer: MEDICARE

## 2024-10-07 ENCOUNTER — ANTICOAGULATION THERAPY VISIT (OUTPATIENT)
Dept: ANTICOAGULATION | Facility: CLINIC | Age: 74
End: 2024-10-07

## 2024-10-07 VITALS
RESPIRATION RATE: 16 BRPM | TEMPERATURE: 96.3 F | DIASTOLIC BLOOD PRESSURE: 69 MMHG | BODY MASS INDEX: 44.1 KG/M2 | HEIGHT: 71 IN | OXYGEN SATURATION: 99 % | WEIGHT: 315 LBS | HEART RATE: 71 BPM | SYSTOLIC BLOOD PRESSURE: 128 MMHG

## 2024-10-07 DIAGNOSIS — I10 PRIMARY HYPERTENSION: ICD-10-CM

## 2024-10-07 DIAGNOSIS — E11.65 TYPE 2 DIABETES MELLITUS WITH HYPERGLYCEMIA, WITH LONG-TERM CURRENT USE OF INSULIN (H): ICD-10-CM

## 2024-10-07 DIAGNOSIS — J41.8 MIXED SIMPLE AND MUCOPURULENT CHRONIC BRONCHITIS (H): ICD-10-CM

## 2024-10-07 DIAGNOSIS — K21.00 GASTROESOPHAGEAL REFLUX DISEASE WITH ESOPHAGITIS WITHOUT HEMORRHAGE: ICD-10-CM

## 2024-10-07 DIAGNOSIS — G60.9 IDIOPATHIC PERIPHERAL NEUROPATHY: ICD-10-CM

## 2024-10-07 DIAGNOSIS — I48.0 PAROXYSMAL ATRIAL FIBRILLATION (H): ICD-10-CM

## 2024-10-07 DIAGNOSIS — Z89.512 HX OF BELOW KNEE AMPUTATION, LEFT (H): ICD-10-CM

## 2024-10-07 DIAGNOSIS — Z89.511 S/P BKA (BELOW KNEE AMPUTATION), RIGHT (H): Primary | ICD-10-CM

## 2024-10-07 DIAGNOSIS — Z79.4 TYPE 2 DIABETES MELLITUS WITH HYPERGLYCEMIA, WITH LONG-TERM CURRENT USE OF INSULIN (H): ICD-10-CM

## 2024-10-07 DIAGNOSIS — E11.610 DIABETIC CHARCOT FOOT (H): ICD-10-CM

## 2024-10-07 DIAGNOSIS — I48.0 PAROXYSMAL ATRIAL FIBRILLATION (H): Primary | ICD-10-CM

## 2024-10-07 LAB — INR (EXTERNAL): 2.2 (ref 0.9–1.1)

## 2024-10-07 PROCEDURE — 99309 SBSQ NF CARE MODERATE MDM 30: CPT | Performed by: NURSE PRACTITIONER

## 2024-10-07 NOTE — LETTER
10/7/2024      Oliver Lawson  [address] [facility] [id number]        [facility]    Code Status:  FULL CODE   Visit Type:   Chief Complaint   Patient presents with     TCU Follow Up     Facility:  [facility] ([facility]) [[id number]]         HPI: Oliver Lawson is a 73 year old male who I am seeing today for follow up on the TCU. Past medical history includes uncontrolled DM2 on insulin, peripheral vascular disease, BKA left (2018) and right transmetatarsal amputation, charcot foot, COPD, paroxysmal atrial fibrillation s/p PPM, RLE lymphedema, rheumatoid arthritis and CATALINA on CPAP. Pt underwent elective Right BKA on 9/17/24 due to chronic DM non healing foot ulcers with charcot. Pt was stabilized acutely and was admitted to [facility] for inpatient rehabilitation on 9/20/24.     Transitional Care Course: Today pt sitting up in wheelchair. Recent right BKA. Pt continues in stump . He denies any pain. Plans to be seen by prosthetics in am. Hx of left BKA in prothesis. He is transferring well. Barriers to discharge include pt has steps to get in his house and does not have a ramp or handrail. Blood sugars in the 200-300s late evening. Low blood sugars after lunch have improved with decrease in insulin early am. Chronic A fib on warfarin.       Assessment/Plan:       S/P R BKA 9/17/2024   Hx S/P below knee amputation, left.  Charcot foot due to diabetes mellitus.   - WBAT  - Incisional site care per vascular surgery.   - Brace:  & RRD. No knee contracture. Plans to be seen by prosthetics in am.   - Arrangements made for eventual prosthetic fitting. Continue , RRD. Follow up with Vascular on 10/18  -Follow up CBC with Hgb 8.8.     Paroxysmal atrial fibrillation  Complete heart block s/p pacemaker.   - S/p PPM for history of 3rd degree heart block.   -Continue Warfarin.   -INR today 2.2.   -Coumadin clinic to dose Warfarin.   -Continue Metoprolol 50 mg daily       Diabetes Mellitus Type 2   Peripheral Neuropathy  - A1c 8.4% (4/17/2024)  -Consistent carb diet  -QID blood sugar checks  -Lantus 15 units in am and 35 units in pm.        -PTA on 55 U glargine QHS + aspart 16/14/13u + SSI and 1000 mg metformin BID.   -Metformin 500 mg bid.   -Aspart 14 units in am, change lunch to 6 units + sliding scale. (Pt have drops after lunch).   -Add 6 units with sliding scale at dinner.     Peripheral vascular disease  Hyperlipidemia  -BLE arterial US in 4/2024 with diffuse arterial calcifications in RLE but no evidence of significant stenoses or occlusions in LLE. PTA on atorvastatin 80 mg daily.   -Atorvastatin 80 mg at bedtime      Hypertension  -Losartan 50 mg daily   -Metoprolol 50 mg daily      COPD (chronic obstructive pulmonary disease)  -Continue to monitor   -no evidence of acute exacerbation.      Rheumatoid Arthritis.   -Follows with rheumatology.  -folic acid 1 mg daily   -Methotrexate 12.5 mg every sunday    CKD  -Baseline Cr ~1.0-1.1  -Follow up BMP unremarkable.       Active Ambulatory Problems     Diagnosis Date Noted     Traumatic hematoma 07/15/2021     ACP (advance care planning) 11/07/2005     Type 2 diabetes mellitus with hyperglycemia (H) 07/19/2021     Renal insufficiency syndrome 01/10/2011     COPD (chronic obstructive pulmonary disease) (H) 12/29/2014     Obesity, morbid (more than 100 lbs over ideal weight or BMI > 40) (H) 12/17/2010     Paroxysmal atrial fibrillation (H) 05/03/2016     Abscess of plantar aspect of foot 08/03/2021     Alcohol dependence (H) 12/13/2010     AV block, 3rd degree (H) 02/07/2016     Bilateral hearing loss 04/25/2016     BPH (benign prostatic hyperplasia) 07/21/2017     Charcot foot due to diabetes mellitus (H) 08/03/2021     Diabetes mellitus without complication (H) 02/07/2005     Fall 07/12/2021     Fall with injury 07/13/2021     GERD (gastroesophageal reflux disease) 03/12/2013     Essential hypertension 12/13/2010     Low  vision, both eyes 02/17/2016     Dysthymic disorder 08/03/2021     Hyperlipidemia 02/07/2005     TGA (transient global amnesia) 11/16/2014     Surgical aftercare, musculoskeletal system 02/28/2019     Superficial bruising 07/12/2021     Spiral fracture of shaft of femur (H) 12/06/2018     Sleep apnea 08/03/2021     S/P placement of cardiac pacemaker 08/29/2016     S/P below knee amputation, left (H) 07/03/2018     Rheumatoid arthritis (H) 01/04/2007     Pre-ulcerative calluses 04/14/2014     Other diseases of lung, not elsewhere classified 11/14/2005     Osteomyelitis of right foot (H) 08/03/2021     Nonspecific abnormal electrocardiogram (ECG) (EKG) 08/03/2021     MRSA (methicillin resistant Staphylococcus aureus) infection 11/23/2012     Lower limb amputation, great toe (H) 03/02/2011     Morbid obesity with BMI of 40.0-44.9, adult (H) 12/07/2018     A-fib (H) 05/06/2022     Umbilical hernia 01/03/2022     Osteoarthritis of right knee 05/03/2022     Acute pain of right knee 09/30/2023     Anticoagulation monitoring, INR range 2-3 05/10/2024     Below-knee amputation, initial encounter (H) 09/20/2024     Cardiac pacemaker 08/29/2016     Hematoma of right knee region 10/02/2023     History of vertebral fracture 11/28/2022     Pulmonary nodules 11/14/2005     Pyogenic inflammation of bone (H) 04/25/2024     S/P transmetatarsal amputation of foot, right (H) 05/04/2024     Tinea cruris 04/27/2024     Venous insufficiency 08/22/2023     Diabetic infection of right foot (H) 04/25/2024     Peripheral vascular disease, unspecified (H) 08/07/2023     Seropositive rheumatoid arthritis (H) 05/24/2013     Type 1 diabetes mellitus (H) 05/24/2013     Resolved Ambulatory Problems     Diagnosis Date Noted     No Resolved Ambulatory Problems     Past Medical History:   Diagnosis Date     Dyspnea on exertion      Gastro-oesophageal reflux disease      Hypertension      Methicillin resistant Staphylococcus aureus in conditions  classified elsewhere and of unspecified site      Morbid obesity (H)      Numbness and tingling      Open wound of foot except toe(s) alone, complicated      Other and unspecified alcohol dependence, unspecified drinking behavior      Other and unspecified hyperlipidemia      Renal insufficiency      Rheumatoid arthritis (H)      Shortness of breath      Type II or unspecified type diabetes mellitus without mention of complication, not stated as uncontrolled      Unspecified pleural effusion      Allergies   Allergen Reactions     Ace Inhibitors Cough     Angiotensin Receptor Blockers Other (See Comments)     ARB-Angiotensin Receptor Antagonist     Contrast Dye Unknown     Diatrizoate Other (See Comments)       All Meds and Allergies reviewed in the record at the facility and is the most up-to-date.    Current Outpatient Medications   Medication Sig Dispense Refill     acetaminophen (TYLENOL) 500 MG tablet Take 1,000 mg by mouth 3 times daily.       atorvastatin (LIPITOR) 80 MG tablet Take 80 mg by mouth daily.       famotidine (PEPCID) 20 MG tablet Take 20 mg by mouth 2 times daily       folic acid (FOLVITE) 1 MG tablet Take 1 mg by mouth daily       insulin aspart (NOVOLOG PEN) 100 UNIT/ML pen Inject subcutaneously 3 times daily (with meals). 14 units with breakfast; 6 units with lunch; 6 units at dinner AND sliding scale TID with meals    Per Sliding Scale:  If Blood Sugar is 150 to 199, give 2 Units.  If Blood Sugar is 200 to 249, give 4 Units.  If Blood Sugar is 250 to 299, give 6 Units.  If Blood Sugar is 300 to 349, give 8 Units.  If Blood Sugar is 350 to 399, give 10 Units.  If Blood Sugar is 400 to 800, give 12 Units.  If Blood Sugar is greater than 800, call MD.       insulin glargine (LANTUS) SOLN 100 UNIT/ML Inject subcutaneously 2 times daily. 15 units every morning AND 35 units every evening at bedtime       losartan (COZAAR) 50 MG tablet Take 50 mg by mouth daily       metFORMIN (GLUCOPHAGE) 500 MG  "tablet Take 500 mg by mouth 2 times daily (with meals).       methocarbamol (ROBAXIN) 750 MG tablet Take 750 mg by mouth 4 times daily.       methotrexate 2.5 MG tablet Take 2.5 mg by mouth once a week.       metoprolol succinate ER (TOPROL XL) 50 MG 24 hr tablet Take 50 mg by mouth daily.       multivitamin w/minerals (THERA-VIT-M) tablet Take 1 tablet by mouth daily       sertraline (ZOLOFT) 50 MG tablet Take 150 mg by mouth every morning.       warfarin ANTICOAGULANT (COUMADIN) 7.5 MG tablet Take 7.5 mg by mouth daily Patient takes 7.5 mg on Thursday, Saturday, Sunday and 5 mg on Monday, Wednesday.  Follow-up INR on 8/9/2021.       No current facility-administered medications for this visit.       REVIEW OF SYSTEMS:   10 point review of systems reviewed and pertinent positives in the HPI.     PHYSICAL EXAMINATION:  Physical Exam     Vital signs: /69   Pulse 71   Temp (!) 96.3  F (35.7  C)   Resp 16   Ht 1.803 m (5' 11\")   Wt (!) 152.9 kg (337 lb)   SpO2 99%   BMI 47.00 kg/m    General: Awake, Alert, oriented x3, siting up in wheelchair, appropriately, follows simple commands, conversant  HEENT:Pink conjunctiva, moist oral mucosa  NECK: Supple  CVS:  S1  S2, without murmur or gallop.   LUNG: Clear to auscultation, No wheezes, rales or rhonci.  BACK: No kyphosis of the thoracic spine  ABDOMEN: Soft, obese, non tender to palpation, with positive bowel sounds  EXTREMITIES: Moves both upper and lower extremities. Right BKA with stump  on. Left BKA with prothesis on.   SKIN: Warm and dry  NEUROLOGIC: Intact, pulses palpable  PSYCHIATRIC: Cognition intact      Labs:  All labs reviewed in the nursing home record and [facility]   @  Lab Results   Component Value Date    WBC 4.8 10/02/2024     Lab Results   Component Value Date    RBC 3.23 10/02/2024     Lab Results   Component Value Date    HGB 8.8 10/02/2024     Lab Results   Component Value Date    HCT 28.4 10/02/2024     Lab Results   Component Value " Date    MCV 88 10/02/2024     Lab Results   Component Value Date    MCH 27.2 10/02/2024     Lab Results   Component Value Date    MCHC 31.0 10/02/2024     Lab Results   Component Value Date    RDW 16.6 10/02/2024     Lab Results   Component Value Date     10/02/2024        @Last Comprehensive Metabolic Panel:  Sodium   Date Value Ref Range Status   10/02/2024 138 135 - 145 mmol/L Final     Potassium   Date Value Ref Range Status   10/02/2024 4.4 3.4 - 5.3 mmol/L Final   05/10/2022 4.5 3.5 - 5.0 mmol/L Final     Chloride   Date Value Ref Range Status   10/02/2024 105 98 - 107 mmol/L Final   05/10/2022 101 98 - 107 mmol/L Final     Carbon Dioxide (CO2)   Date Value Ref Range Status   10/02/2024 27 22 - 29 mmol/L Final   05/10/2022 26 22 - 31 mmol/L Final     Anion Gap   Date Value Ref Range Status   10/02/2024 6 (L) 7 - 15 mmol/L Final   05/10/2022 10 5 - 18 mmol/L Final     Glucose   Date Value Ref Range Status   10/02/2024 101 (H) 70 - 99 mg/dL Final   05/10/2022 263 (H) 70 - 125 mg/dL Final     Urea Nitrogen   Date Value Ref Range Status   10/02/2024 17.0 8.0 - 23.0 mg/dL Final   05/10/2022 14 8 - 28 mg/dL Final     Creatinine   Date Value Ref Range Status   10/02/2024 1.04 0.67 - 1.17 mg/dL Final     GFR Estimate   Date Value Ref Range Status   10/02/2024 75 >60 mL/min/1.73m2 Final     Calcium   Date Value Ref Range Status   10/02/2024 9.9 8.8 - 10.4 mg/dL Final     Comment:     Reference intervals for this test were updated on 7/16/2024 to reflect our healthy population more accurately. There may be differences in the flagging of prior results with similar values performed with this method. Those prior results can be interpreted in the context of the updated reference intervals.     This note has been dictated using voice recognition software. Any grammatical or context distortions are unintentional and inherent to the software    Electronically signed by: Tram Guy, CNP       Sincerely,        Tram  Brooks, NP

## 2024-10-07 NOTE — PROGRESS NOTES
ANTICOAGULATION MANAGEMENT     Oliver Lawson 73 year old male is on warfarin with therapeutic INR result. (Goal INR 2.0-3.0)    Recent labs: (last 7 days)     10/07/24  0000   INR 2.2*       ASSESSMENT     Source(s): Chart Review  Previous INR was Therapeutic last 2(+) visits  Pt was hospitalized 9/20/24-10/1/24 for right BKA. Hx:Below knee amputation caused by Charcot foot, vascular disease, uncontrolled diabetes in context of previous left BKA (2018)   Pt at [facility]         PLAN     Recommended plan for ongoing change(s) affecting INR     Dosing Instructions: Continue your current warfarin dose with next INR in 1 week       Summary  As of 10/7/2024      Full warfarin instructions:  7.5 mg every Mon, Wed, Fri; 5 mg all other days   Next INR check:  10/14/2024               Detailed voice message left for Team two/Ayesha at [phone] [facility] nurse ([facility]) with dosing instructions and follow up date.     Orders given to  Homecare nurse/facility to recheck    Education provided: Please call back if any changes to your diet, medications or how you've been taking warfarin  Contact [phone] with any changes, questions or concerns.     Plan made per [facility] anticoagulation protocol    Crory Berg RN  10/7/2024  Anticoagulation Clinic  [employer]rif[employer] for routing messages: p [facility] (TCU/C/TACHO)  [facility] patient phone line: [phone]        _______________________________________________________________________     Anticoagulation Episode Summary       Current INR goal:  2.0-3.0   TTR:  --   Target end date:  Indefinite   Send INR reminders to:  [facility] (U/C/correction)    Indications    Paroxysmal atrial fibrillation (H) [I48.0]             Comments:               Anticoagulation Care Providers       Provider Role Specialty Phone number    Tram Guy NP Referring Nurse Practitioner [phone]

## 2024-10-07 NOTE — PROGRESS NOTES
Incoming fax from [facility]    Date of result 10/7/24    INR result 2.2    Route result to: [facility] (TCU/LTC/TACHO)

## 2024-10-08 NOTE — PROGRESS NOTES
[facility]    Code Status:  FULL CODE   Visit Type:   Chief Complaint   Patient presents with    TCU Follow Up     Facility:  [facility] [place] ([facility]) [[id number]]         HPI: Oliver Lawson is a 73 year old male who I am seeing today for follow up on the TCU. Past medical history includes uncontrolled DM2 on insulin, peripheral vascular disease, BKA left (2018) and right transmetatarsal amputation, charcot foot, COPD, paroxysmal atrial fibrillation s/p PPM, RLE lymphedema, rheumatoid arthritis and CATALINA on CPAP. Pt underwent elective Right BKA on 9/17/24 due to chronic DM non healing foot ulcers with charcot. Pt was stabilized acutely and was admitted to [facility] for inpatient rehabilitation on 9/20/24.     Transitional Care Course: Today pt sitting up in wheelchair. Recent right BKA. Pt continues in stump . He denies any pain. Plans to be seen by prosthetics in am. Hx of left BKA in prothesis. He is transferring well. Barriers to discharge include pt has steps to get in his house and does not have a ramp or handrail. Blood sugars in the 200-300s late evening. Low blood sugars after lunch have improved with decrease in insulin early am. Chronic A fib on warfarin.       Assessment/Plan:       S/P R BKA 9/17/2024   Hx S/P below knee amputation, left.  Charcot foot due to diabetes mellitus.   - WBAT  - Incisional site care per vascular surgery.   - Brace:  & RRD. No knee contracture. Plans to be seen by prosthetics in am.   - Arrangements made for eventual prosthetic fitting. Continue , RRD. Follow up with Vascular on 10/18  -Follow up CBC with Hgb 8.8.     Paroxysmal atrial fibrillation  Complete heart block s/p pacemaker.   - S/p PPM for history of 3rd degree heart block.   -Continue Warfarin.   -INR today 2.2.   -Coumadin clinic to dose Warfarin.   -Continue Metoprolol 50 mg daily      Diabetes Mellitus Type 2   Peripheral Neuropathy  - A1c 8.4% (4/17/2024)  -Consistent carb  diet  -QID blood sugar checks  -Lantus 15 units in am and 35 units in pm.        -PTA on 55 U glargine QHS + aspart 16/14/13u + SSI and 1000 mg metformin BID.   -Metformin 500 mg bid.   -Aspart 14 units in am, change lunch to 6 units + sliding scale. (Pt have drops after lunch).   -Add 6 units with sliding scale at dinner.     Peripheral vascular disease  Hyperlipidemia  -BLE arterial US in 4/2024 with diffuse arterial calcifications in RLE but no evidence of significant stenoses or occlusions in LLE. PTA on atorvastatin 80 mg daily.   -Atorvastatin 80 mg at bedtime      Hypertension  -Losartan 50 mg daily   -Metoprolol 50 mg daily      COPD (chronic obstructive pulmonary disease)  -Continue to monitor   -no evidence of acute exacerbation.      Rheumatoid Arthritis.   -Follows with rheumatology.  -folic acid 1 mg daily   -Methotrexate 12.5 mg every sunday    CKD  -Baseline Cr ~1.0-1.1  -Follow up BMP unremarkable.       Active Ambulatory Problems     Diagnosis Date Noted    Traumatic hematoma 07/15/2021    ACP (advance care planning) 11/07/2005    Type 2 diabetes mellitus with hyperglycemia (H) 07/19/2021    Renal insufficiency syndrome 01/10/2011    COPD (chronic obstructive pulmonary disease) (H) 12/29/2014    Obesity, morbid (more than 100 lbs over ideal weight or BMI > 40) (H) 12/17/2010    Paroxysmal atrial fibrillation (H) 05/03/2016    Abscess of plantar aspect of foot 08/03/2021    Alcohol dependence (H) 12/13/2010    AV block, 3rd degree (H) 02/07/2016    Bilateral hearing loss 04/25/2016    BPH (benign prostatic hyperplasia) 07/21/2017    Charcot foot due to diabetes mellitus (H) 08/03/2021    Diabetes mellitus without complication (H) 02/07/2005    Fall 07/12/2021    Fall with injury 07/13/2021    GERD (gastroesophageal reflux disease) 03/12/2013    Essential hypertension 12/13/2010    Low vision, both eyes 02/17/2016    Dysthymic disorder 08/03/2021    Hyperlipidemia 02/07/2005    TGA (transient global  amnesia) 11/16/2014    Surgical aftercare, musculoskeletal system 02/28/2019    Superficial bruising 07/12/2021    Spiral fracture of shaft of femur (H) 12/06/2018    Sleep apnea 08/03/2021    S/P placement of cardiac pacemaker 08/29/2016    S/P below knee amputation, left (H) 07/03/2018    Rheumatoid arthritis (H) 01/04/2007    Pre-ulcerative calluses 04/14/2014    Other diseases of lung, not elsewhere classified 11/14/2005    Osteomyelitis of right foot (H) 08/03/2021    Nonspecific abnormal electrocardiogram (ECG) (EKG) 08/03/2021    MRSA (methicillin resistant Staphylococcus aureus) infection 11/23/2012    Lower limb amputation, great toe (H) 03/02/2011    Morbid obesity with BMI of 40.0-44.9, adult (H) 12/07/2018    A-fib (H) 05/06/2022    Umbilical hernia 01/03/2022    Osteoarthritis of right knee 05/03/2022    Acute pain of right knee 09/30/2023    Anticoagulation monitoring, INR range 2-3 05/10/2024    Below-knee amputation, initial encounter (H) 09/20/2024    Cardiac pacemaker 08/29/2016    Hematoma of right knee region 10/02/2023    History of vertebral fracture 11/28/2022    Pulmonary nodules 11/14/2005    Pyogenic inflammation of bone (H) 04/25/2024    S/P transmetatarsal amputation of foot, right (H) 05/04/2024    Tinea cruris 04/27/2024    Venous insufficiency 08/22/2023    Diabetic infection of right foot (H) 04/25/2024    Peripheral vascular disease, unspecified (H) 08/07/2023    Seropositive rheumatoid arthritis (H) 05/24/2013    Type 1 diabetes mellitus (H) 05/24/2013     Resolved Ambulatory Problems     Diagnosis Date Noted    No Resolved Ambulatory Problems     Past Medical History:   Diagnosis Date    Dyspnea on exertion     Gastro-oesophageal reflux disease     Hypertension     Methicillin resistant Staphylococcus aureus in conditions classified elsewhere and of unspecified site     Morbid obesity (H)     Numbness and tingling     Open wound of foot except toe(s) alone, complicated     Other  and unspecified alcohol dependence, unspecified drinking behavior     Other and unspecified hyperlipidemia     Renal insufficiency     Rheumatoid arthritis (H)     Shortness of breath     Type II or unspecified type diabetes mellitus without mention of complication, not stated as uncontrolled     Unspecified pleural effusion      Allergies   Allergen Reactions    Ace Inhibitors Cough    Angiotensin Receptor Blockers Other (See Comments)     ARB-Angiotensin Receptor Antagonist    Contrast Dye Unknown    Diatrizoate Other (See Comments)       All Meds and Allergies reviewed in the record at the facility and is the most up-to-date.    Current Outpatient Medications   Medication Sig Dispense Refill    acetaminophen (TYLENOL) 500 MG tablet Take 1,000 mg by mouth 3 times daily.      atorvastatin (LIPITOR) 80 MG tablet Take 80 mg by mouth daily.      famotidine (PEPCID) 20 MG tablet Take 20 mg by mouth 2 times daily      folic acid (FOLVITE) 1 MG tablet Take 1 mg by mouth daily      insulin aspart (NOVOLOG PEN) 100 UNIT/ML pen Inject subcutaneously 3 times daily (with meals). 14 units with breakfast; 6 units with lunch; 6 units at dinner AND sliding scale TID with meals    Per Sliding Scale:  If Blood Sugar is 150 to 199, give 2 Units.  If Blood Sugar is 200 to 249, give 4 Units.  If Blood Sugar is 250 to 299, give 6 Units.  If Blood Sugar is 300 to 349, give 8 Units.  If Blood Sugar is 350 to 399, give 10 Units.  If Blood Sugar is 400 to 800, give 12 Units.  If Blood Sugar is greater than 800, call MD.      insulin glargine (LANTUS) SOLN 100 UNIT/ML Inject subcutaneously 2 times daily. 15 units every morning AND 35 units every evening at bedtime      losartan (COZAAR) 50 MG tablet Take 50 mg by mouth daily      metFORMIN (GLUCOPHAGE) 500 MG tablet Take 500 mg by mouth 2 times daily (with meals).      methocarbamol (ROBAXIN) 750 MG tablet Take 750 mg by mouth 4 times daily.      methotrexate 2.5 MG tablet Take 2.5 mg by  "mouth once a week.      metoprolol succinate ER (TOPROL XL) 50 MG 24 hr tablet Take 50 mg by mouth daily.      multivitamin w/minerals (THERA-VIT-M) tablet Take 1 tablet by mouth daily      sertraline (ZOLOFT) 50 MG tablet Take 150 mg by mouth every morning.      warfarin ANTICOAGULANT (COUMADIN) 7.5 MG tablet Take 7.5 mg by mouth daily Patient takes 7.5 mg on Thursday, Saturday, Sunday and 5 mg on Monday, Wednesday.  Follow-up INR on 8/9/2021.       No current facility-administered medications for this visit.       REVIEW OF SYSTEMS:   10 point review of systems reviewed and pertinent positives in the HPI.     PHYSICAL EXAMINATION:  Physical Exam     Vital signs: /69   Pulse 71   Temp (!) 96.3  F (35.7  C)   Resp 16   Ht 1.803 m (5' 11\")   Wt (!) 152.9 kg (337 lb)   SpO2 99%   BMI 47.00 kg/m    General: Awake, Alert, oriented x3, siting up in wheelchair, appropriately, follows simple commands, conversant  HEENT:Pink conjunctiva, moist oral mucosa  NECK: Supple  CVS:  S1  S2, without murmur or gallop.   LUNG: Clear to auscultation, No wheezes, rales or rhonci.  BACK: No kyphosis of the thoracic spine  ABDOMEN: Soft, obese, non tender to palpation, with positive bowel sounds  EXTREMITIES: Moves both upper and lower extremities. Right BKA with stump  on. Left BKA with prothesis on.   SKIN: Warm and dry  NEUROLOGIC: Intact, pulses palpable  PSYCHIATRIC: Cognition intact      Labs:  All labs reviewed in the nursing home record and [facility]   @  Lab Results   Component Value Date    WBC 4.8 10/02/2024     Lab Results   Component Value Date    RBC 3.23 10/02/2024     Lab Results   Component Value Date    HGB 8.8 10/02/2024     Lab Results   Component Value Date    HCT 28.4 10/02/2024     Lab Results   Component Value Date    MCV 88 10/02/2024     Lab Results   Component Value Date    MCH 27.2 10/02/2024     Lab Results   Component Value Date    MCHC 31.0 10/02/2024     Lab Results   Component Value Date "    RDW 16.6 10/02/2024     Lab Results   Component Value Date     10/02/2024        @Last Comprehensive Metabolic Panel:  Sodium   Date Value Ref Range Status   10/02/2024 138 135 - 145 mmol/L Final     Potassium   Date Value Ref Range Status   10/02/2024 4.4 3.4 - 5.3 mmol/L Final   05/10/2022 4.5 3.5 - 5.0 mmol/L Final     Chloride   Date Value Ref Range Status   10/02/2024 105 98 - 107 mmol/L Final   05/10/2022 101 98 - 107 mmol/L Final     Carbon Dioxide (CO2)   Date Value Ref Range Status   10/02/2024 27 22 - 29 mmol/L Final   05/10/2022 26 22 - 31 mmol/L Final     Anion Gap   Date Value Ref Range Status   10/02/2024 6 (L) 7 - 15 mmol/L Final   05/10/2022 10 5 - 18 mmol/L Final     Glucose   Date Value Ref Range Status   10/02/2024 101 (H) 70 - 99 mg/dL Final   05/10/2022 263 (H) 70 - 125 mg/dL Final     Urea Nitrogen   Date Value Ref Range Status   10/02/2024 17.0 8.0 - 23.0 mg/dL Final   05/10/2022 14 8 - 28 mg/dL Final     Creatinine   Date Value Ref Range Status   10/02/2024 1.04 0.67 - 1.17 mg/dL Final     GFR Estimate   Date Value Ref Range Status   10/02/2024 75 >60 mL/min/1.73m2 Final     Calcium   Date Value Ref Range Status   10/02/2024 9.9 8.8 - 10.4 mg/dL Final     Comment:     Reference intervals for this test were updated on 7/16/2024 to reflect our healthy population more accurately. There may be differences in the flagging of prior results with similar values performed with this method. Those prior results can be interpreted in the context of the updated reference intervals.     This note has been dictated using voice recognition software. Any grammatical or context distortions are unintentional and inherent to the software    Electronically signed by: Tram Guy, CNP

## 2024-10-09 ENCOUNTER — TRANSITIONAL CARE UNIT VISIT (OUTPATIENT)
Dept: GERIATRICS | Facility: CLINIC | Age: 74
End: 2024-10-09
Payer: MEDICARE

## 2024-10-09 VITALS
HEIGHT: 71 IN | SYSTOLIC BLOOD PRESSURE: 132 MMHG | RESPIRATION RATE: 16 BRPM | TEMPERATURE: 95 F | BODY MASS INDEX: 44.1 KG/M2 | DIASTOLIC BLOOD PRESSURE: 76 MMHG | OXYGEN SATURATION: 98 % | WEIGHT: 315 LBS | HEART RATE: 75 BPM

## 2024-10-09 DIAGNOSIS — Z79.4 TYPE 2 DIABETES MELLITUS WITH HYPERGLYCEMIA, WITH LONG-TERM CURRENT USE OF INSULIN (H): ICD-10-CM

## 2024-10-09 DIAGNOSIS — Z89.512 HX OF BELOW KNEE AMPUTATION, LEFT (H): ICD-10-CM

## 2024-10-09 DIAGNOSIS — E11.65 TYPE 2 DIABETES MELLITUS WITH HYPERGLYCEMIA, WITH LONG-TERM CURRENT USE OF INSULIN (H): ICD-10-CM

## 2024-10-09 DIAGNOSIS — I10 PRIMARY HYPERTENSION: ICD-10-CM

## 2024-10-09 DIAGNOSIS — Z89.511 S/P BKA (BELOW KNEE AMPUTATION), RIGHT (H): Primary | ICD-10-CM

## 2024-10-09 DIAGNOSIS — I48.0 PAROXYSMAL ATRIAL FIBRILLATION (H): ICD-10-CM

## 2024-10-09 PROCEDURE — 99309 SBSQ NF CARE MODERATE MDM 30: CPT | Performed by: NURSE PRACTITIONER

## 2024-10-09 NOTE — LETTER
10/9/2024      Oliver Lawson  [address] [id number]        [facility]    Code Status:  FULL CODE   Visit Type:   Chief Complaint   Patient presents with     TCU Follow Up     Facility:  [facility] ([facility]) [[id number]]         HPI: Oliver Lawson is a 73 year old male who I am seeing today for follow up on the TCU. Past medical history includes uncontrolled DM2 on insulin, peripheral vascular disease, BKA left (2018) and right transmetatarsal amputation, charcot foot, COPD, paroxysmal atrial fibrillation s/p PPM, RLE lymphedema, rheumatoid arthritis and CATALINA on CPAP. Pt underwent elective Right BKA on 9/17/24 due to chronic DM non healing foot ulcers with charcot. Pt was stabilized acutely and was admitted to [facility] for inpatient rehabilitation on 9/20/24.     Transitional Care Course: Today pt sitting up in wheelchair. Recent right BKA. Pt continues in stump . He denies any pain.He was  seen by prosthetics yesterday.  Hx of left BKA in prothesis. He is transferring well. He is currently awaiting a ramp being built at his home to get in. DM2. Blood sugars 200-300s in the late evening. I did attempt to give him 6 units with dinner however he had low BS overnight. This was discontinued. He continues on Lantus in am and pm. Chronic A fib on warfarin. BP satisfactory.       Assessment/Plan:       S/P R BKA 9/17/2024   Hx S/P below knee amputation, left.  Charcot foot due to diabetes mellitus.   - WBAT  - Incisional site care per vascular surgery.   - Brace:  & RRD. No knee contracture. Plans to be seen by prosthetics in am.   - Arrangements made for eventual prosthetic fitting. Continue , RRD. Follow up with Vascular on 10/18  -Follow up CBC with Hgb 8.8.     Paroxysmal atrial fibrillation  Complete heart block s/p pacemaker.   - S/p PPM for history of 3rd degree heart block.   -Continue Warfarin.   -INR  2.2.   -Coumadin clinic to dose  Warfarin.   -Continue Metoprolol 50 mg daily      Diabetes Mellitus Type 2   Peripheral Neuropathy  - A1c 8.4% (4/17/2024)  -Consistent carb diet  -QID blood sugar checks  -increase am Lantus to 18 units. Continue 35 units in pm.        -PTA on 55 U glargine QHS + aspart 16/14/13u + SSI and 1000 mg metformin BID.   -Metformin 500 mg bid.   -Aspart 14 units in am, change lunch to 6 units + sliding scale. (Pt having drops after lunch).   -Encourage bedtime snack.     Peripheral vascular disease  Hyperlipidemia  -BLE arterial US in 4/2024 with diffuse arterial calcifications in RLE but no evidence of significant stenoses or occlusions in LLE. PTA on atorvastatin 80 mg daily.   -Atorvastatin 80 mg at bedtime      Hypertension  -Losartan 50 mg daily   -Metoprolol 50 mg daily       CKD  -Baseline Cr ~1.0-1.1  -Follow up BMP unremarkable.       Active Ambulatory Problems     Diagnosis Date Noted     Traumatic hematoma 07/15/2021     ACP (advance care planning) 11/07/2005     Type 2 diabetes mellitus with hyperglycemia (H) 07/19/2021     Renal insufficiency syndrome 01/10/2011     COPD (chronic obstructive pulmonary disease) (H) 12/29/2014     Obesity, morbid (more than 100 lbs over ideal weight or BMI > 40) (H) 12/17/2010     Paroxysmal atrial fibrillation (H) 05/03/2016     Abscess of plantar aspect of foot 08/03/2021     Alcohol dependence (H) 12/13/2010     AV block, 3rd degree (H) 02/07/2016     Bilateral hearing loss 04/25/2016     BPH (benign prostatic hyperplasia) 07/21/2017     Charcot foot due to diabetes mellitus (H) 08/03/2021     Diabetes mellitus without complication (H) 02/07/2005     Fall 07/12/2021     Fall with injury 07/13/2021     GERD (gastroesophageal reflux disease) 03/12/2013     Essential hypertension 12/13/2010     Low vision, both eyes 02/17/2016     Dysthymic disorder 08/03/2021     Hyperlipidemia 02/07/2005     TGA (transient global amnesia) 11/16/2014     Surgical aftercare, musculoskeletal  system 02/28/2019     Superficial bruising 07/12/2021     Spiral fracture of shaft of femur (H) 12/06/2018     Sleep apnea 08/03/2021     S/P placement of cardiac pacemaker 08/29/2016     S/P below knee amputation, left (H) 07/03/2018     Rheumatoid arthritis (H) 01/04/2007     Pre-ulcerative calluses 04/14/2014     Other diseases of lung, not elsewhere classified 11/14/2005     Osteomyelitis of right foot (H) 08/03/2021     Nonspecific abnormal electrocardiogram (ECG) (EKG) 08/03/2021     MRSA (methicillin resistant Staphylococcus aureus) infection 11/23/2012     Lower limb amputation, great toe (H) 03/02/2011     Morbid obesity with BMI of 40.0-44.9, adult (H) 12/07/2018     A-fib (H) 05/06/2022     Umbilical hernia 01/03/2022     Osteoarthritis of right knee 05/03/2022     Acute pain of right knee 09/30/2023     Anticoagulation monitoring, INR range 2-3 05/10/2024     Below-knee amputation, initial encounter (H) 09/20/2024     Cardiac pacemaker 08/29/2016     Hematoma of right knee region 10/02/2023     History of vertebral fracture 11/28/2022     Pulmonary nodules 11/14/2005     Pyogenic inflammation of bone (H) 04/25/2024     S/P transmetatarsal amputation of foot, right (H) 05/04/2024     Tinea cruris 04/27/2024     Venous insufficiency 08/22/2023     Diabetic infection of right foot (H) 04/25/2024     Peripheral vascular disease, unspecified (H) 08/07/2023     Seropositive rheumatoid arthritis (H) 05/24/2013     Type 1 diabetes mellitus (H) 05/24/2013     Resolved Ambulatory Problems     Diagnosis Date Noted     No Resolved Ambulatory Problems     Past Medical History:   Diagnosis Date     Dyspnea on exertion      Gastro-oesophageal reflux disease      Hypertension      Methicillin resistant Staphylococcus aureus in conditions classified elsewhere and of unspecified site      Morbid obesity (H)      Numbness and tingling      Open wound of foot except toe(s) alone, complicated      Other and unspecified  alcohol dependence, unspecified drinking behavior      Other and unspecified hyperlipidemia      Renal insufficiency      Rheumatoid arthritis (H)      Shortness of breath      Type II or unspecified type diabetes mellitus without mention of complication, not stated as uncontrolled      Unspecified pleural effusion      Allergies   Allergen Reactions     Ace Inhibitors Cough     Angiotensin Receptor Blockers Other (See Comments)     ARB-Angiotensin Receptor Antagonist     Contrast Dye Unknown     Diatrizoate Other (See Comments)       All Meds and Allergies reviewed in the record at the facility and is the most up-to-date.    Current Outpatient Medications   Medication Sig Dispense Refill     acetaminophen (TYLENOL) 500 MG tablet Take 1,000 mg by mouth 3 times daily.       atorvastatin (LIPITOR) 80 MG tablet Take 80 mg by mouth daily.       famotidine (PEPCID) 20 MG tablet Take 20 mg by mouth 2 times daily       folic acid (FOLVITE) 1 MG tablet Take 1 mg by mouth daily       insulin aspart (NOVOLOG PEN) 100 UNIT/ML pen Inject subcutaneously 3 times daily (with meals). 14 units with breakfast; 6 units with lunch; 6 units at dinner AND sliding scale TID with meals    Per Sliding Scale:  If Blood Sugar is 150 to 199, give 2 Units.  If Blood Sugar is 200 to 249, give 4 Units.  If Blood Sugar is 250 to 299, give 6 Units.  If Blood Sugar is 300 to 349, give 8 Units.  If Blood Sugar is 350 to 399, give 10 Units.  If Blood Sugar is 400 to 800, give 12 Units.  If Blood Sugar is greater than 800, call MD.       insulin glargine (LANTUS) SOLN 100 UNIT/ML Inject subcutaneously 2 times daily. 18 units every morning AND 35 units every evening at bedtime       losartan (COZAAR) 50 MG tablet Take 50 mg by mouth daily       metFORMIN (GLUCOPHAGE) 500 MG tablet Take 500 mg by mouth 2 times daily (with meals).       methocarbamol (ROBAXIN) 750 MG tablet Take 750 mg by mouth 4 times daily.       methotrexate 2.5 MG tablet Take 2.5 mg  "by mouth once a week.       metoprolol succinate ER (TOPROL XL) 50 MG 24 hr tablet Take 50 mg by mouth daily.       multivitamin w/minerals (THERA-VIT-M) tablet Take 1 tablet by mouth daily       sertraline (ZOLOFT) 50 MG tablet Take 150 mg by mouth every morning.       warfarin ANTICOAGULANT (COUMADIN) 7.5 MG tablet Take 7.5 mg by mouth daily Patient takes 7.5 mg on Thursday, Saturday, Sunday and 5 mg on Monday, Wednesday.  Follow-up INR on 8/9/2021.       No current facility-administered medications for this visit.       REVIEW OF SYSTEMS:   10 point review of systems reviewed and pertinent positives in the HPI.     PHYSICAL EXAMINATION:  Physical Exam     Vital signs: /76   Pulse 75   Temp (!) 95  F (35  C)   Resp 16   Ht 1.803 m (5' 11\")   Wt 144.4 kg (318 lb 6.4 oz)   SpO2 98%   BMI 44.41 kg/m    General: Awake, Alert, oriented x3, siting up in wheelchair, appropriately, follows simple commands, conversant  HEENT:Pink conjunctiva, moist oral mucosa  NECK: Supple  CVS:  S1  S2, without murmur or gallop.   LUNG: Clear to auscultation, No wheezes, rales or rhonci.  BACK: No kyphosis of the thoracic spine  ABDOMEN: Soft, obese, non tender to palpation, with positive bowel sounds  EXTREMITIES: Moves both upper and lower extremities. Right BKA with stump  on. Left BKA with prothesis on.   SKIN: Warm and dry  NEUROLOGIC: Intact, pulses palpable  PSYCHIATRIC: Cognition intact      Labs:  All labs reviewed in the nursing home record and [facility]   @  Lab Results   Component Value Date    WBC 4.8 10/02/2024     Lab Results   Component Value Date    RBC 3.23 10/02/2024     Lab Results   Component Value Date    HGB 8.8 10/02/2024     Lab Results   Component Value Date    HCT 28.4 10/02/2024     Lab Results   Component Value Date    MCV 88 10/02/2024     Lab Results   Component Value Date    MCH 27.2 10/02/2024     Lab Results   Component Value Date    MCHC 31.0 10/02/2024     Lab Results   Component Value " Date    RDW 16.6 10/02/2024     Lab Results   Component Value Date     10/02/2024        @Last Comprehensive Metabolic Panel:  Sodium   Date Value Ref Range Status   10/02/2024 138 135 - 145 mmol/L Final     Potassium   Date Value Ref Range Status   10/02/2024 4.4 3.4 - 5.3 mmol/L Final   05/10/2022 4.5 3.5 - 5.0 mmol/L Final     Chloride   Date Value Ref Range Status   10/02/2024 105 98 - 107 mmol/L Final   05/10/2022 101 98 - 107 mmol/L Final     Carbon Dioxide (CO2)   Date Value Ref Range Status   10/02/2024 27 22 - 29 mmol/L Final   05/10/2022 26 22 - 31 mmol/L Final     Anion Gap   Date Value Ref Range Status   10/02/2024 6 (L) 7 - 15 mmol/L Final   05/10/2022 10 5 - 18 mmol/L Final     Glucose   Date Value Ref Range Status   10/02/2024 101 (H) 70 - 99 mg/dL Final   05/10/2022 263 (H) 70 - 125 mg/dL Final     Urea Nitrogen   Date Value Ref Range Status   10/02/2024 17.0 8.0 - 23.0 mg/dL Final   05/10/2022 14 8 - 28 mg/dL Final     Creatinine   Date Value Ref Range Status   10/02/2024 1.04 0.67 - 1.17 mg/dL Final     GFR Estimate   Date Value Ref Range Status   10/02/2024 75 >60 mL/min/1.73m2 Final     Calcium   Date Value Ref Range Status   10/02/2024 9.9 8.8 - 10.4 mg/dL Final     Comment:     Reference intervals for this test were updated on 7/16/2024 to reflect our healthy population more accurately. There may be differences in the flagging of prior results with similar values performed with this method. Those prior results can be interpreted in the context of the updated reference intervals.     This note has been dictated using voice recognition software. Any grammatical or context distortions are unintentional and inherent to the software    Electronically signed by: Tram Guy CNP       Sincerely,        Tram Guy NP

## 2024-10-10 NOTE — PROGRESS NOTES
[facility] GERIATRIC SERVICES    Code Status:  FULL CODE   Visit Type:   Chief Complaint   Patient presents with    TCU Follow Up     Facility:  [facility] ([facility]) [[id number]]         HPI: Oliver Lawson is a 73 year old male who I am seeing today for follow up on the TCU. Past medical history includes uncontrolled DM2 on insulin, peripheral vascular disease, BKA left (2018) and right transmetatarsal amputation, charcot foot, COPD, paroxysmal atrial fibrillation s/p PPM, RLE lymphedema, rheumatoid arthritis and CATALINA on CPAP. Pt underwent elective Right BKA on 9/17/24 due to chronic DM non healing foot ulcers with charcot. Pt was stabilized acutely and was admitted to [facility] for inpatient rehabilitation on 9/20/24.     Transitional Care Course: Today pt sitting up in wheelchair. Recent right BKA. Pt continues in stump . He denies any pain.He was  seen by prosthetics yesterday.  Hx of left BKA in prothesis. He is transferring well. He is currently awaiting a ramp being built at his home to get in. DM2. Blood sugars 200-300s in the late evening. I did attempt to give him 6 units with dinner however he had low BS overnight. This was discontinued. He continues on Lantus in am and pm. Chronic A fib on warfarin. BP satisfactory.       Assessment/Plan:       S/P R BKA 9/17/2024   Hx S/P below knee amputation, left.  Charcot foot due to diabetes mellitus.   - WBAT  - Incisional site care per vascular surgery.   - Brace:  & RRD. No knee contracture. Plans to be seen by prosthetics in am.   - Arrangements made for eventual prosthetic fitting. Continue , RRD.   -Follow up CBC with Hgb 8.8.   -Follow up with Vascular on 10/18.     Paroxysmal atrial fibrillation  Complete heart block s/p pacemaker.   - S/p PPM for history of 3rd degree heart block.   -Continue Warfarin.   -INR  2.2.   -Coumadin clinic to dose Warfarin.   -Continue Metoprolol 50 mg daily      Diabetes Mellitus Type 2   Peripheral  Neuropathy  - A1c 8.4% (4/17/2024)  -Consistent carb diet  -QID blood sugar checks  -increase am Lantus to 18 units. Continue 35 units in pm.        -PTA on 55 U glargine QHS + aspart 16/14/13u + SSI and 1000 mg metformin BID.   -Metformin 500 mg bid.   -Aspart 14 units in am, change lunch to 6 units + sliding scale. (Pt having drops after lunch).   -Encourage bedtime snack.     Peripheral vascular disease  Hyperlipidemia  -BLE arterial US in 4/2024 with diffuse arterial calcifications in RLE but no evidence of significant stenoses or occlusions in LLE. PTA on atorvastatin 80 mg daily.   -Atorvastatin 80 mg at bedtime      Hypertension  -Losartan 50 mg daily   -Metoprolol 50 mg daily       CKD  -Baseline Cr ~1.0-1.1  -Follow up BMP unremarkable.       -ok to discharge home with current with meds and treatments.   -Home PT, OT, HHA and RN for medication management.   -Follow up with PCP in 1-2 weeks.     Active Ambulatory Problems     Diagnosis Date Noted    Traumatic hematoma 07/15/2021    ACP (advance care planning) 11/07/2005    Type 2 diabetes mellitus with hyperglycemia (H) 07/19/2021    Renal insufficiency syndrome 01/10/2011    COPD (chronic obstructive pulmonary disease) (H) 12/29/2014    Obesity, morbid (more than 100 lbs over ideal weight or BMI > 40) (H) 12/17/2010    Paroxysmal atrial fibrillation (H) 05/03/2016    Abscess of plantar aspect of foot 08/03/2021    Alcohol dependence (H) 12/13/2010    AV block, 3rd degree (H) 02/07/2016    Bilateral hearing loss 04/25/2016    BPH (benign prostatic hyperplasia) 07/21/2017    Charcot foot due to diabetes mellitus (H) 08/03/2021    Diabetes mellitus without complication (H) 02/07/2005    Fall 07/12/2021    Fall with injury 07/13/2021    GERD (gastroesophageal reflux disease) 03/12/2013    Essential hypertension 12/13/2010    Low vision, both eyes 02/17/2016    Dysthymic disorder 08/03/2021    Hyperlipidemia 02/07/2005    TGA (transient global amnesia)  11/16/2014    Surgical aftercare, musculoskeletal system 02/28/2019    Superficial bruising 07/12/2021    Spiral fracture of shaft of femur (H) 12/06/2018    Sleep apnea 08/03/2021    S/P placement of cardiac pacemaker 08/29/2016    S/P below knee amputation, left (H) 07/03/2018    Rheumatoid arthritis (H) 01/04/2007    Pre-ulcerative calluses 04/14/2014    Other diseases of lung, not elsewhere classified 11/14/2005    Osteomyelitis of right foot (H) 08/03/2021    Nonspecific abnormal electrocardiogram (ECG) (EKG) 08/03/2021    MRSA (methicillin resistant Staphylococcus aureus) infection 11/23/2012    Lower limb amputation, great toe (H) 03/02/2011    Morbid obesity with BMI of 40.0-44.9, adult (H) 12/07/2018    A-fib (H) 05/06/2022    Umbilical hernia 01/03/2022    Osteoarthritis of right knee 05/03/2022    Acute pain of right knee 09/30/2023    Anticoagulation monitoring, INR range 2-3 05/10/2024    Below-knee amputation, initial encounter (H) 09/20/2024    Cardiac pacemaker 08/29/2016    Hematoma of right knee region 10/02/2023    History of vertebral fracture 11/28/2022    Pulmonary nodules 11/14/2005    Pyogenic inflammation of bone (H) 04/25/2024    S/P transmetatarsal amputation of foot, right (H) 05/04/2024    Tinea cruris 04/27/2024    Venous insufficiency 08/22/2023    Diabetic infection of right foot (H) 04/25/2024    Peripheral vascular disease, unspecified (H) 08/07/2023    Seropositive rheumatoid arthritis (H) 05/24/2013    Type 1 diabetes mellitus (H) 05/24/2013     Resolved Ambulatory Problems     Diagnosis Date Noted    No Resolved Ambulatory Problems     Past Medical History:   Diagnosis Date    Dyspnea on exertion     Gastro-oesophageal reflux disease     Hypertension     Methicillin resistant Staphylococcus aureus in conditions classified elsewhere and of unspecified site     Morbid obesity (H)     Numbness and tingling     Open wound of foot except toe(s) alone, complicated     Other and  unspecified alcohol dependence, unspecified drinking behavior     Other and unspecified hyperlipidemia     Renal insufficiency     Rheumatoid arthritis (H)     Shortness of breath     Type II or unspecified type diabetes mellitus without mention of complication, not stated as uncontrolled     Unspecified pleural effusion      Allergies   Allergen Reactions    Ace Inhibitors Cough    Angiotensin Receptor Blockers Other (See Comments)     ARB-Angiotensin Receptor Antagonist    Contrast Dye Unknown    Diatrizoate Other (See Comments)       All Meds and Allergies reviewed in the record at the facility and is the most up-to-date.    Current Outpatient Medications   Medication Sig Dispense Refill    acetaminophen (TYLENOL) 500 MG tablet Take 1,000 mg by mouth 3 times daily.      atorvastatin (LIPITOR) 80 MG tablet Take 80 mg by mouth daily.      famotidine (PEPCID) 20 MG tablet Take 20 mg by mouth 2 times daily      folic acid (FOLVITE) 1 MG tablet Take 1 mg by mouth daily      insulin aspart (NOVOLOG PEN) 100 UNIT/ML pen Inject subcutaneously 3 times daily (with meals). 14 units with breakfast; 6 units with lunch; 6 units at dinner AND sliding scale TID with meals    Per Sliding Scale:  If Blood Sugar is 150 to 199, give 2 Units.  If Blood Sugar is 200 to 249, give 4 Units.  If Blood Sugar is 250 to 299, give 6 Units.  If Blood Sugar is 300 to 349, give 8 Units.  If Blood Sugar is 350 to 399, give 10 Units.  If Blood Sugar is 400 to 800, give 12 Units.  If Blood Sugar is greater than 800, call MD.      insulin glargine (LANTUS) SOLN 100 UNIT/ML Inject subcutaneously 2 times daily. 18 units every morning AND 35 units every evening at bedtime      losartan (COZAAR) 50 MG tablet Take 50 mg by mouth daily      metFORMIN (GLUCOPHAGE) 500 MG tablet Take 500 mg by mouth 2 times daily (with meals).      methocarbamol (ROBAXIN) 750 MG tablet Take 750 mg by mouth 4 times daily.      methotrexate 2.5 MG tablet Take 2.5 mg by mouth  "once a week.      metoprolol succinate ER (TOPROL XL) 50 MG 24 hr tablet Take 50 mg by mouth daily.      multivitamin w/minerals (THERA-VIT-M) tablet Take 1 tablet by mouth daily      sertraline (ZOLOFT) 50 MG tablet Take 150 mg by mouth every morning.      warfarin ANTICOAGULANT (COUMADIN) 7.5 MG tablet Take 7.5 mg by mouth daily Patient takes 7.5 mg on Thursday, Saturday, Sunday and 5 mg on Monday, Wednesday.  Follow-up INR on 8/9/2021.       No current facility-administered medications for this visit.       REVIEW OF SYSTEMS:   10 point review of systems reviewed and pertinent positives in the HPI.     PHYSICAL EXAMINATION:  Physical Exam     Vital signs: /76   Pulse 75   Temp (!) 95  F (35  C)   Resp 16   Ht 1.803 m (5' 11\")   Wt 144.4 kg (318 lb 6.4 oz)   SpO2 98%   BMI 44.41 kg/m    General: Awake, Alert, oriented x3, siting up in wheelchair, appropriately, follows simple commands, conversant  HEENT:Pink conjunctiva, moist oral mucosa  NECK: Supple  CVS:  S1  S2, without murmur or gallop.   LUNG: Clear to auscultation, No wheezes, rales or rhonci.  BACK: No kyphosis of the thoracic spine  ABDOMEN: Soft, obese, non tender to palpation, with positive bowel sounds  EXTREMITIES: Moves both upper and lower extremities. Right BKA with stump  on. Left BKA with prothesis on.   SKIN: Warm and dry  NEUROLOGIC: Intact, pulses palpable  PSYCHIATRIC: Cognition intact      Labs:  All labs reviewed in the nursing home record and [facility]   @  Lab Results   Component Value Date    WBC 4.8 10/02/2024     Lab Results   Component Value Date    RBC 3.23 10/02/2024     Lab Results   Component Value Date    HGB 8.8 10/02/2024     Lab Results   Component Value Date    HCT 28.4 10/02/2024     Lab Results   Component Value Date    MCV 88 10/02/2024     Lab Results   Component Value Date    MCH 27.2 10/02/2024     Lab Results   Component Value Date    MCHC 31.0 10/02/2024     Lab Results   Component Value Date    RDW " 16.6 10/02/2024     Lab Results   Component Value Date     10/02/2024        @Last Comprehensive Metabolic Panel:  Sodium   Date Value Ref Range Status   10/02/2024 138 135 - 145 mmol/L Final     Potassium   Date Value Ref Range Status   10/02/2024 4.4 3.4 - 5.3 mmol/L Final   05/10/2022 4.5 3.5 - 5.0 mmol/L Final     Chloride   Date Value Ref Range Status   10/02/2024 105 98 - 107 mmol/L Final   05/10/2022 101 98 - 107 mmol/L Final     Carbon Dioxide (CO2)   Date Value Ref Range Status   10/02/2024 27 22 - 29 mmol/L Final   05/10/2022 26 22 - 31 mmol/L Final     Anion Gap   Date Value Ref Range Status   10/02/2024 6 (L) 7 - 15 mmol/L Final   05/10/2022 10 5 - 18 mmol/L Final     Glucose   Date Value Ref Range Status   10/02/2024 101 (H) 70 - 99 mg/dL Final   05/10/2022 263 (H) 70 - 125 mg/dL Final     Urea Nitrogen   Date Value Ref Range Status   10/02/2024 17.0 8.0 - 23.0 mg/dL Final   05/10/2022 14 8 - 28 mg/dL Final     Creatinine   Date Value Ref Range Status   10/02/2024 1.04 0.67 - 1.17 mg/dL Final     GFR Estimate   Date Value Ref Range Status   10/02/2024 75 >60 mL/min/1.73m2 Final     Calcium   Date Value Ref Range Status   10/02/2024 9.9 8.8 - 10.4 mg/dL Final     Comment:     Reference intervals for this test were updated on 7/16/2024 to reflect our healthy population more accurately. There may be differences in the flagging of prior results with similar values performed with this method. Those prior results can be interpreted in the context of the updated reference intervals.       DISCHARGE PLAN/FACE TO FACE:  I certify that this patient is under my care and that I, or a nurse practitioner or physician's assistant working with me, had a face-to-face encounter that meets the physician face-to-face encounter requirements with this patient.       I certify that, based on my findings, the following services are medically necessary home health services.    My clinical findings support the need for  the above skilled services.    This patient is homebound because:  Pt with recent Right BKA on 9/17/24.     The patient is, or has been, under my care and I have initiated the establishment of the plan of care. This patient will be followed by a physician who will periodically review the plan of care.    > 35 minutes spent reviewing discharge medications, home care services and follow ups. Pt is having a ramp built at his home.     This note has been dictated using voice recognition software. Any grammatical or context distortions are unintentional and inherent to the software    Electronically signed by: Tram Guy CNP

## 2024-10-14 ENCOUNTER — ANTICOAGULATION THERAPY VISIT (OUTPATIENT)
Dept: ANTICOAGULATION | Facility: CLINIC | Age: 74
End: 2024-10-14
Payer: MEDICARE

## 2024-10-14 DIAGNOSIS — I48.0 PAROXYSMAL ATRIAL FIBRILLATION (H): Primary | ICD-10-CM

## 2024-10-14 LAB — INR (EXTERNAL): 1.8 (ref 0.9–1.1)

## 2024-10-14 NOTE — PROGRESS NOTES
ANTICOAGULATION MANAGEMENT     Oliver Lawson 73 year old male is on warfarin with subtherapeutic INR result. (Goal INR 2.0-3.0)    Recent labs: (last 7 days)     10/14/24  0000   INR 1.8*       ASSESSMENT     Source(s): Chart Review and Home Care/Facility Nurse     Warfarin doses taken: Warfarin taken as instructed  Diet: No new diet changes identified  Medication/supplement changes: None noted  New illness, injury, or hospitalization: No  Signs or symptoms of bleeding or clotting: No  Previous result: Therapeutic last 2(+) visits  Additional findings:  surgical site is improving.  Patient is discharging from [facility] today.       PLAN     Recommended plan for no diet, medication or health factor changes affecting INR     Dosing Instructions: Increase your warfarin dose (5.9% change) with next INR in 1 week       Summary  As of 10/14/2024      Full warfarin instructions:  5 mg every Sun, Thu, Sat; 7.5 mg all other days   Next INR check:  10/21/2024               Telephone call with [facility] nurse ([facility]) who verbalizes understanding and agrees to plan and who agrees to plan and repeated back plan correctly    Orders given to  Homecare nurse/facility to recheck    Education provided: Please call back if any changes to your diet, medications or how you've been taking warfarin    Plan made per [facility] anticoagulation protocol    Farhana Arciniega RN  10/14/2024  Anticoagulation Clinic  [facility] for routing messages: [facility] (U/C/[facility])  [facility] patient phone line: [phone]        _______________________________________________________________________     Anticoagulation Episode Summary       Current INR goal:  2.0-3.0   TTR:  0.0% (2 d)   Target end date:  Indefinite   Send INR reminders to:  [facility] (U/LTC/[facility]O)    Indications    Paroxysmal atrial fibrillation (H) [I48.0]             Comments:                Anticoagulation Care Providers       Provider Role Specialty Phone number    Tram Guy NP Referring Nurse Practitioner [phone]

## 2024-10-19 ENCOUNTER — HEALTH MAINTENANCE LETTER (OUTPATIENT)
Age: 74
End: 2024-10-19

## 2024-10-23 ENCOUNTER — DOCUMENTATION ONLY (OUTPATIENT)
Dept: ANTICOAGULATION | Facility: CLINIC | Age: 74
End: 2024-10-23
Payer: MEDICARE

## 2024-10-23 DIAGNOSIS — I48.0 PAROXYSMAL ATRIAL FIBRILLATION (H): Primary | ICD-10-CM

## 2024-10-23 NOTE — PROGRESS NOTES
ANTICOAGULATION  MANAGEMENT    Oliver Lawson is being discharged from the [facility] ([facility]).    Reason for discharge: discharged from TCU[facility]; returning to pre-admission warfarin management    Anticoagulation episode resolved and ACC referral closed    Patient notes state discharged from [facility] 10/14/24.    If patient needs warfarin management in the future, please send a new referral    Mag Carias RN

## 2025-01-25 ENCOUNTER — HEALTH MAINTENANCE LETTER (OUTPATIENT)
Age: 75
End: 2025-01-25

## 2025-05-03 ENCOUNTER — HEALTH MAINTENANCE LETTER (OUTPATIENT)
Age: 75
End: 2025-05-03

## 2025-08-16 ENCOUNTER — HEALTH MAINTENANCE LETTER (OUTPATIENT)
Age: 75
End: 2025-08-16